# Patient Record
Sex: FEMALE | Race: WHITE | NOT HISPANIC OR LATINO | URBAN - METROPOLITAN AREA
[De-identification: names, ages, dates, MRNs, and addresses within clinical notes are randomized per-mention and may not be internally consistent; named-entity substitution may affect disease eponyms.]

---

## 2024-08-13 ENCOUNTER — APPOINTMENT (OUTPATIENT)
Dept: PHYSICAL THERAPY | Facility: CLINIC | Age: 34
End: 2024-08-13
Payer: COMMERCIAL

## 2024-08-20 ENCOUNTER — APPOINTMENT (OUTPATIENT)
Dept: PHYSICAL THERAPY | Facility: CLINIC | Age: 34
End: 2024-08-20
Payer: COMMERCIAL

## 2024-08-27 ENCOUNTER — TREATMENT (OUTPATIENT)
Dept: PHYSICAL THERAPY | Facility: CLINIC | Age: 34
End: 2024-08-27
Payer: COMMERCIAL

## 2024-08-27 DIAGNOSIS — M54.50 ACUTE BILATERAL LOW BACK PAIN WITHOUT SCIATICA: Primary | ICD-10-CM

## 2024-08-27 DIAGNOSIS — M62.830 BACK SPASM: ICD-10-CM

## 2024-08-27 PROCEDURE — 97110 THERAPEUTIC EXERCISES: CPT | Mod: GP

## 2024-08-27 NOTE — PROGRESS NOTES
Physical Therapy Treatment  Patient Name: Radha Hernández  MRN: [id number]  Today's Date: 8/27/2024   Visit 6/MN  Time Calculation  Start Time: 1630  Stop Time: 1715  Time Calculation (min): 45 min  Diagnosis: acute on chronic LBP    PRECAUTIONS: none    SUBJECTIVE:  Patient reports yesterday had increased R LBP.  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  Compliant with HEP? yes    OBJECTIVE:  R LE lower at ASIS and R medial malleolus.  Equal after MET    TREATMENT:  - Therex:  Mat Ex:  -PPT x 10 reps  -marching with abdom bracing x 10 reps  -bridging with GTB abduction x 10 reps  -resisted hip abd with GTB x 10 reps  -add ball squeeze x 10  -leg lowering x 10   -Les on ball: bridging x 10 reps  -Les on ball: bridge with hamstring curl x 3 reps  Modified planks (knees, elbows) 2 x 30 sec  S/L 1/2 planks x 30 sec bilat  Curent HEP:  Seated hamstring stretch, LTR, pelvic tilts, marching with PT, quad stretch   Added bridge with resisted hip abd with band, leg lowering and resisted hip adduction  -Manual:  -MET to R LE for possible R anterior pelvic rotation x 2 reps    ASSESSMENT:    Patient's pain appears to be over R SIJ.  Will continue to monitor alignment, determine continued need for heel lift L.  PLAN:  Cont to progression flexibility, core strengthening and conditioning as courtney.   Check SIJ and possibly try US to SIJ

## 2024-09-03 ENCOUNTER — TREATMENT (OUTPATIENT)
Dept: PHYSICAL THERAPY | Facility: CLINIC | Age: 34
End: 2024-09-03
Payer: COMMERCIAL

## 2024-09-03 DIAGNOSIS — M54.50 ACUTE BILATERAL LOW BACK PAIN WITHOUT SCIATICA: Primary | ICD-10-CM

## 2024-09-03 DIAGNOSIS — M62.830 BACK SPASM: ICD-10-CM

## 2024-09-03 PROCEDURE — 97110 THERAPEUTIC EXERCISES: CPT | Mod: GP

## 2024-09-03 PROCEDURE — 97035 APP MDLTY 1+ULTRASOUND EA 15: CPT | Mod: GP

## 2024-09-03 NOTE — PROGRESS NOTES
Physical Therapy Treatment  Patient Name: Radha Hernández  MRN: [id number]  Today's Date: 9/3/2024   Visit 7/MN  Time Calculation  Start Time: 1630  Stop Time: 1715  Time Calculation (min): 45 min  Diagnosis: acute on chronic LBP    PRECAUTIONS: none    SUBJECTIVE:  Patient reports some R anterior hip tightness after last visit, later had some back spasms on R side then subsided.  Yesterday, had tightness across LB walking at fair.  Removed lift since yesterday.  Compliant with HEP? yes    OBJECTIVE:  R LE lower at ASIS and R medial malleolus.  Equal after MET    TREATMENT:  - Therex:  Stationary bike x 5 min (seat= 3)  Standing hamstring stretch at stair x 1 min bilat  Standing bilat quad stretch x 1 min  Multihip x 15 reps bilat  -plate 2 hip abduction and plate 3 hip extension  Resisted side stepping at cable column- 7.5# x 5 reps L and R  Curent HEP:  Seated hamstring stretch, LTR, pelvic tilts, marching with PT, quad stretch   Added bridge with resisted hip abd with band, leg lowering and resisted hip adduction  -Manual:  -MET to R LE for possible R anterior pelvic rotation x 2 reps    -Modalities:  US to R SIJ x 8 min at 3 MHz cont, 1.5 w/cm2    ASSESSMENT:    Patient without increased pain during visit and alignment improved after MET  PLAN:  Cont to progression flexibility, core strengthening and conditioning as courtney.   Monitor LE alignment  Go without lift until next visit

## 2024-09-05 DIAGNOSIS — J30.2 SEASONAL ALLERGIES: ICD-10-CM

## 2024-09-05 DIAGNOSIS — Z30.41 ORAL CONTRACEPTIVE USE: ICD-10-CM

## 2024-09-05 RX ORDER — LEVONORGESTREL AND ETHINYL ESTRADIOL 0.15-0.03
1 KIT ORAL DAILY
Qty: 84 TABLET | Refills: 3 | Status: SHIPPED | OUTPATIENT
Start: 2024-09-05

## 2024-09-05 RX ORDER — AZELASTINE 1 MG/ML
1 SPRAY, METERED NASAL 2 TIMES DAILY
Qty: 30 ML | Refills: 3 | Status: SHIPPED | OUTPATIENT
Start: 2024-09-05

## 2024-09-10 ENCOUNTER — TREATMENT (OUTPATIENT)
Dept: PHYSICAL THERAPY | Facility: CLINIC | Age: 34
End: 2024-09-10
Payer: COMMERCIAL

## 2024-09-10 DIAGNOSIS — M62.830 BACK SPASM: ICD-10-CM

## 2024-09-10 DIAGNOSIS — M54.50 ACUTE BILATERAL LOW BACK PAIN WITHOUT SCIATICA: Primary | ICD-10-CM

## 2024-09-10 PROCEDURE — 97110 THERAPEUTIC EXERCISES: CPT | Mod: GP

## 2024-09-10 NOTE — PROGRESS NOTES
Physical Therapy Treatment  Patient Name: Radha Hernández  MRN: [id number]  Today's Date: 9/24/2024   Visit 8/MN     Diagnosis: acute on chronic LBP    PRECAUTIONS: none    SUBJECTIVE:  Patient reports this has been the best week she's had in a long time.  No sharp pain, only pressure.  Compliant with HEP? yes    OBJECTIVE:  LE equal at malleoli today    TREATMENT:  - Therex:  Stationary bike x 5 min (seat= 3)  Standing hamstring stretch at stair x 1 min bilat  Standing bilat quad stretch x 1 min  Multihip x 15 reps bilat  -plate 2 hip abduction and plate 3 hip extension  Resisted side stepping at cable column- 7.5# x 5 reps L and R  Curent HEP:  Seated hamstring stretch, LTR, pelvic tilts, marching with PT, quad stretch   Added bridge with resisted hip abd with band, leg lowering and resisted hip adduction    -Modalities:  US to R SIJ x 8 min at 3 MHz cont, 1.5 w/cm2    ASSESSMENT:    Patient with improved alignment and pain level today.    PLAN:  Cont to progression flexibility, core strengthening and conditioning as courtney.   Monitor LE alignment as needed.

## 2024-09-11 ENCOUNTER — TELEPHONE (OUTPATIENT)
Dept: PHYSICAL THERAPY | Facility: CLINIC | Age: 34
End: 2024-09-11
Payer: COMMERCIAL

## 2024-09-19 ENCOUNTER — TELEPHONE (OUTPATIENT)
Dept: PHYSICAL THERAPY | Facility: CLINIC | Age: 34
End: 2024-09-19
Payer: COMMERCIAL

## 2024-09-26 ENCOUNTER — APPOINTMENT (OUTPATIENT)
Dept: PHYSICAL THERAPY | Facility: CLINIC | Age: 34
End: 2024-09-26
Payer: COMMERCIAL

## 2024-10-03 ENCOUNTER — TREATMENT (OUTPATIENT)
Dept: PHYSICAL THERAPY | Facility: CLINIC | Age: 34
End: 2024-10-03
Payer: COMMERCIAL

## 2024-10-03 DIAGNOSIS — M54.50 ACUTE BILATERAL LOW BACK PAIN WITHOUT SCIATICA: Primary | ICD-10-CM

## 2024-10-03 DIAGNOSIS — M62.830 BACK SPASM: ICD-10-CM

## 2024-10-03 PROCEDURE — 97035 APP MDLTY 1+ULTRASOUND EA 15: CPT | Mod: GP

## 2024-10-03 PROCEDURE — 97110 THERAPEUTIC EXERCISES: CPT | Mod: GP

## 2024-10-03 NOTE — PROGRESS NOTES
Physical Therapy Treatment  Patient Name: Radha Hernández  MRN: [id number]  Today's Date: 10/3/2024   Visit 9/MN  Time Calculation  Start Time: 1630  Stop Time: 1715  Time Calculation (min): 45 min  Diagnosis: acute on chronic LBP    PRECAUTIONS: none    SUBJECTIVE:  Patient reports back pain has been okay lately.  Mostly just stiffness in LB, no spasms.  Wakes with stiffness, also noticed increased pain when sitting at end of the day.  Compliant with HEP? yes    OBJECTIVE:  Gait unremarkable    TREATMENT:  - Therex:  Stationary bike x 5 min (seat= 3)  Standing hamstring stretch at stair x 1 min bilat  Standing bilat quad stretch x 1 min  Multihip x 15 reps bilat  -plate 2 hip abduction and plate 3 hip extension  Les on ball:   -DKC x 2 min  -LTR x 2 min  (Encouraged to try LTR and SKC before getting out of bed in am)    Resisted side stepping at cable column- 7.5# x 5 reps L and R  Curent HEP:  Seated hamstring stretch, LTR, pelvic tilts, marching with PT, quad stretch, bridge with resisted hip abd with band, leg lowering and resisted hip adduction    -Modalities:  US to R SIJ x 8 min at 3 MHz cont, 1.5 w/cm2    ASSESSMENT:    Patient with improved pain overall since beginning PT but does still have some stiffness in LB, possibly due to facet hypertrophy.    PLAN:  Discussed that patient has general appt with Dr. Ulloa in November.  If not improved enough at that time, will inquire about LBP, further imaging.

## 2024-10-10 ENCOUNTER — TREATMENT (OUTPATIENT)
Dept: PHYSICAL THERAPY | Facility: CLINIC | Age: 34
End: 2024-10-10
Payer: COMMERCIAL

## 2024-10-10 DIAGNOSIS — M54.50 ACUTE BILATERAL LOW BACK PAIN WITHOUT SCIATICA: Primary | ICD-10-CM

## 2024-10-10 DIAGNOSIS — M62.830 BACK SPASM: ICD-10-CM

## 2024-10-10 PROCEDURE — 97110 THERAPEUTIC EXERCISES: CPT | Mod: GP

## 2024-10-10 PROCEDURE — 97035 APP MDLTY 1+ULTRASOUND EA 15: CPT | Mod: GP

## 2024-10-10 NOTE — PROGRESS NOTES
Physical Therapy Treatment  Patient Name: Radha Hernández  MRN: [id number]  Today's Date: 10/10/2024   Visit 10/MN  Time Calculation  Start Time: 1630  Stop Time: 1710  Time Calculation (min): 40 min  Diagnosis: acute on chronic LBP    PRECAUTIONS: none    SUBJECTIVE:  Mostly just stiffness in LB, no spasms.  Stiffness more late in the day rather than early morning now.  Compliant with HEP? yes    OBJECTIVE:  More difficulty walking to L with resisted sidestepping than to R    TREATMENT:  - Therex:  Stationary bike x 5 min (seat= 3)  Standing hamstring stretch at stair x 1 min bilat  Standing bilat quad stretch x 1 min  Multihip x 15 reps bilat  -plate 2 hip abduction and flexion, plate 3 hip extension  Resisted side stepping at cable column- 7.5# x 5 reps L and R    Curent HEP:  Seated hamstring stretch, LTR, pelvic tilts, marching with PT, quad stretch, bridge with resisted hip abd with band, leg lowering and resisted hip adduction    -Modalities:  US to R SIJ x 8 min at 3 MHz cont, 1.5 w/cm2    ASSESSMENT:    Patient with improved pain level overall and no longer getting muscle spasms.    PLAN:   May add planks next visit.

## 2024-11-05 NOTE — PROGRESS NOTES
"Subjective   Patient ID: Radha Hernández \"Devan" is a 34 y.o. female who presents for Annual Exam.  Today she is accompanied by alone.     HPI  1.  Healthcare maintenance  Overall patient is doing well.   Immunization: Tdap 11/2023, willing to update Influenza vaccination  COVID-19 vaccine up-to-date x2  Colon Cancer Screening: No family history  OB/GYN: Sees Dr. Garcia, Pap smear July 2024 negative for cytology but positive for HPV, with recommendation to repeat in 1 year  Diet: Working on diet  Exercise: Attempting to exercise more frequently  Tobacco: Denies use  EtOH:  Socially     2.  Anxiety  Takes fluoxetine 40 mg daily and Atarax 25 mg 3 times daily as needed  She had stopped fluoxetine over the summer due to improved mood. 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  Seasonal allergies  Has a history of seasonal allergies and continues to take fluticasone and Astelin  Has been out of Astelin so has not been taking and has noticed increased phlegm in her throat  Requesting refills to be sent to her pharmacy    4.  Vitamin D/B12 deficiency  Has a history of vitamin D and B12 deficiency on her lab work from April 2023  Currently taking supplementation of both    5.  Prediabetes  Hemoglobin A1c was done earlier this year at 5.8%  Patient denies any polyuria/polydipsia  Currently managed with diet and exercise, stated that she decrease carbohydrate and sugar intake  Has lost about 8 pounds with diet and exercise    6.  Chronic lower back pain  Patient has a history of chronic lower back pain with intermittent worsening of symptoms.  May 2024 lumbar x-ray showed multilevel facet hypertrophy which means that these small joints are slightly enlarged which could cause her signs/symptoms of back pain which is most pronounced at L5-S1.  Previously on Medrol Dosepak and " Flexeril during acute on chronic lower back pain, as well as physical therapy, however, is not taking flexeril currently  Reports pain comes and goes when she is doing physical therapy  Her physical therapist noted SIJ inflammation  Endorses occasional spasms and that she cannot stand straight up  Denies any radiation down the legs, numbness or tingling    7. Abdominal nausea  2 month history of loss of appetite and nausea  She typically tries to force herself to eat and she becomes nauseous shortly after  Eats a snack in the mornings and 2 meals throughout the day  Has had an 8 lb weight loss, however, this has been on purpose  Denies localized abdominal pain, bowel changes, bloody stool, vomiting, dysphagia, fever, chills, recent illness  Martin family history of GI cancer    Current Outpatient Medications on File Prior to Visit   Medication Sig Dispense Refill    fluticasone (Flonase) 50 mcg/actuation nasal spray Administer 1 spray into each nostril 2 times a day. 16 g 3    Kurvelo, 28, 0.15-0.03 mg tablet Take 1 tablet by mouth once daily. 84 tablet 3    [DISCONTINUED] azelastine (Astelin) 137 mcg (0.1 %) nasal spray Administer 1 spray into each nostril 2 times a day. 30 mL 3    [DISCONTINUED] cyclobenzaprine (Flexeril) 5 mg tablet Take 1 tablet (5 mg) by mouth 3 times a day. 30 tablet 0    [DISCONTINUED] FLUoxetine (PROzac) 40 mg capsule Take 1 capsule (40 mg) by mouth once daily. 90 capsule 1    [DISCONTINUED] hydrOXYzine HCL (Atarax) 25 mg tablet Take 1 tablet (25 mg) by mouth 3 times a day as needed for anxiety. 30 tablet 0    [DISCONTINUED] omeprazole (PriLOSEC) 20 mg DR capsule Take 1 capsule (20 mg) by mouth in the morning and 1 capsule (20 mg) before bedtime. Do not crush or chew. . 30 capsule 1     No current facility-administered medications on file prior to visit.        No Known Allergies    Immunization History   Administered Date(s) Administered    DTaP, Unspecified 1990, 1990,  "1990, 02/24/1992, 08/02/1995, 11/05/2002    Flu vaccine, trivalent, preservative free, age 6 months and greater (Fluarix/Fluzone/Flulaval) 11/06/2024    Hepatitis B vaccine, 19 yrs and under (RECOMBIVAX, ENGERIX) 08/22/1997, 07/22/1998, 08/16/2001    HiB, unspecified 1990, 04/13/1991, 08/21/1991, 02/24/1992    Influenza, seasonal, injectable 10/15/2020    MMR vaccine, subcutaneous (MMR II) 08/21/1991, 08/16/2001    Meningococcal ACWY-D (Menactra) 4-valent conjugate vaccine 04/20/2006    Meningococcal MPSV4 08/06/2009    Pfizer Purple Cap SARS-CoV-2 02/26/2021, 03/19/2021    Polio, Unspecified 1990, 1990, 02/24/1992, 08/02/1995    Tdap vaccine, age 7 year and older (BOOSTRIX, ADACEL) 07/22/2011, 11/13/2023         Review of Systems  All pertinent positive symptoms are included in the history of present illness.  All other systems have been reviewed and are negative and noncontributory to this patient's current ailments.     Objective   /78 (BP Location: Left arm, Patient Position: Sitting, BP Cuff Size: Adult)   Pulse 87   Ht 1.765 m (5' 9.5\")   Wt 85.4 kg (188 lb 3.2 oz)   SpO2 98%   BMI 27.39 kg/m²   BSA: 2.05 meters squared      Physical Exam  CONSTITUTIONAL - well nourished, well developed, looks like stated age, in no acute distress, not ill-appearing, and not tired appearing  SKIN - normal skin color and pigmentation, normal skin turgor without rash, lesions, or nodules visualized  HEAD - no trauma, normocephalic  EYES - normal external exam  ENT - TM's intact, no injection, no signs of infection, uvula midline, normal tongue movement and throat normal, no exudate, nasal passage without discharge and patent  NECK - supple without rigidity, no neck mass was observed, no thyromegaly or thyroid nodules  CHEST - clear to auscultation, no wheezing, no crackles and no rales, good effort  CARDIAC - regular rate and regular rhythm, no skipped beats, no murmur  ABDOMEN - no " organomegaly, soft, nontender, nondistended, normal bowel sounds, no guarding/rebound/rigidity  EXTREMITIES - no edema, no deformities  NEUROLOGICAL - normal gait, normal balance, normal motor, no ataxia, bilateral patellar DTRs 2+ equal and symmetrical; alert, oriented and no focal signs  PSYCHIATRIC - alert, pleasant and cordial, age-appropriate  IMMUNOLOGIC - no cervical lymphadenopathy      Assessment/Plan   1.  Healthcare maintenance  Complete history and physical examination was performed  EKG normal sinus rhythm  We will notify you of the results of your blood work and make recommendations accordingly  Continue following up with your OB/GYN for your well woman visits  Influenza vaccine updated today    2.  Anxiety  Continue fluoxetine to 40 mg daily and hydroxyzine 25 mg to use on an as-needed basis  Refill sent to your pharmacy  If this worsens, please let me know and we will adjust accordingly    3.  Seasonal allergies  I recommend that you resume taking both nasal sprays twice daily  Continue azelastine and fluticasone as described at today's visit  Can also take mucinex over the counter to help with phlegm in your throat  Refill sent to your pharmacy  If this continues to be an issue, the next step would be otolaryngology consult    4.  B12 and vitamin D deficiency  Continue taking supplementation  We will recheck blood work if you begin to experience symptoms    5.  Prediabetes  Please attempt to eat a well-balanced diet and exercise regularly  We will repeat your hemoglobin A1c    6.  Chronic back pain  We will order a lumbar MRI and notify you of the results  Discussed possible benefit of SIJ injections with PMR which we mutually agreed to hold off on at this time pending MRI results  Please let us know if you change your mind and would like to follow up with PMR for SIJ injections    7.  Abdominal nausea  It was discussed in great detail that most likely this is due to restarting your  Prozac/fluoxetine at a higher dose at 40 mg  We recommend taking the omeprazole twice daily for the next few weeks  If this continues to be an issue, we will discuss a GI consult    Please contact the office if you have any further questions/concerns  Otherwise, please follow-up in 6 months for further care and treatment

## 2024-11-06 ENCOUNTER — LAB (OUTPATIENT)
Dept: LAB | Facility: LAB | Age: 34
End: 2024-11-06
Payer: COMMERCIAL

## 2024-11-06 ENCOUNTER — APPOINTMENT (OUTPATIENT)
Dept: PRIMARY CARE | Facility: CLINIC | Age: 34
End: 2024-11-06
Payer: COMMERCIAL

## 2024-11-06 VITALS
HEIGHT: 70 IN | HEART RATE: 87 BPM | BODY MASS INDEX: 26.94 KG/M2 | SYSTOLIC BLOOD PRESSURE: 114 MMHG | DIASTOLIC BLOOD PRESSURE: 78 MMHG | WEIGHT: 188.2 LBS | OXYGEN SATURATION: 98 %

## 2024-11-06 DIAGNOSIS — F41.9 ANXIETY: ICD-10-CM

## 2024-11-06 DIAGNOSIS — R73.03 PREDIABETES: ICD-10-CM

## 2024-11-06 DIAGNOSIS — Z00.00 HEALTHCARE MAINTENANCE: ICD-10-CM

## 2024-11-06 DIAGNOSIS — K21.9 GASTROESOPHAGEAL REFLUX DISEASE WITHOUT ESOPHAGITIS: ICD-10-CM

## 2024-11-06 DIAGNOSIS — M54.50 ACUTE BILATERAL LOW BACK PAIN WITHOUT SCIATICA: ICD-10-CM

## 2024-11-06 DIAGNOSIS — G89.29 CHRONIC LOW BACK PAIN WITHOUT SCIATICA, UNSPECIFIED BACK PAIN LATERALITY: ICD-10-CM

## 2024-11-06 DIAGNOSIS — Z00.00 HEALTHCARE MAINTENANCE: Primary | ICD-10-CM

## 2024-11-06 DIAGNOSIS — M62.830 BACK SPASM: ICD-10-CM

## 2024-11-06 DIAGNOSIS — J30.2 SEASONAL ALLERGIES: ICD-10-CM

## 2024-11-06 DIAGNOSIS — Z23 FLU VACCINE NEED: ICD-10-CM

## 2024-11-06 DIAGNOSIS — M54.50 CHRONIC LOW BACK PAIN WITHOUT SCIATICA, UNSPECIFIED BACK PAIN LATERALITY: ICD-10-CM

## 2024-11-06 DIAGNOSIS — E55.9 VITAMIN D DEFICIENCY: ICD-10-CM

## 2024-11-06 DIAGNOSIS — E53.8 VITAMIN B12 DEFICIENCY: ICD-10-CM

## 2024-11-06 LAB
ALBUMIN SERPL BCP-MCNC: 4.4 G/DL (ref 3.4–5)
ALP SERPL-CCNC: 31 U/L (ref 33–110)
ALT SERPL W P-5'-P-CCNC: 10 U/L (ref 7–45)
ANION GAP SERPL CALC-SCNC: 10 MMOL/L (ref 10–20)
AST SERPL W P-5'-P-CCNC: 15 U/L (ref 9–39)
BASOPHILS # BLD AUTO: 0.06 X10*3/UL (ref 0–0.1)
BASOPHILS NFR BLD AUTO: 0.9 %
BILIRUB SERPL-MCNC: 0.8 MG/DL (ref 0–1.2)
BUN SERPL-MCNC: 10 MG/DL (ref 6–23)
CALCIUM SERPL-MCNC: 9.1 MG/DL (ref 8.6–10.3)
CHLORIDE SERPL-SCNC: 104 MMOL/L (ref 98–107)
CHOLEST SERPL-MCNC: 152 MG/DL (ref 0–199)
CHOLESTEROL/HDL RATIO: 2.8
CO2 SERPL-SCNC: 27 MMOL/L (ref 21–32)
CREAT SERPL-MCNC: 0.73 MG/DL (ref 0.5–1.05)
EGFRCR SERPLBLD CKD-EPI 2021: >90 ML/MIN/1.73M*2
EOSINOPHIL # BLD AUTO: 0.12 X10*3/UL (ref 0–0.7)
EOSINOPHIL NFR BLD AUTO: 1.8 %
ERYTHROCYTE [DISTWIDTH] IN BLOOD BY AUTOMATED COUNT: 13 % (ref 11.5–14.5)
GLUCOSE SERPL-MCNC: 79 MG/DL (ref 74–99)
HCT VFR BLD AUTO: 45.8 % (ref 36–46)
HCV AB SER QL: NONREACTIVE
HDLC SERPL-MCNC: 54.1 MG/DL
HGB BLD-MCNC: 14.4 G/DL (ref 12–16)
HIV 1+2 AB+HIV1 P24 AG SERPL QL IA: NONREACTIVE
IMM GRANULOCYTES # BLD AUTO: 0.02 X10*3/UL (ref 0–0.7)
IMM GRANULOCYTES NFR BLD AUTO: 0.3 % (ref 0–0.9)
LDLC SERPL CALC-MCNC: 78 MG/DL
LYMPHOCYTES # BLD AUTO: 1.53 X10*3/UL (ref 1.2–4.8)
LYMPHOCYTES NFR BLD AUTO: 23.3 %
MCH RBC QN AUTO: 29.9 PG (ref 26–34)
MCHC RBC AUTO-ENTMCNC: 31.4 G/DL (ref 32–36)
MCV RBC AUTO: 95 FL (ref 80–100)
MONOCYTES # BLD AUTO: 0.56 X10*3/UL (ref 0.1–1)
MONOCYTES NFR BLD AUTO: 8.5 %
NEUTROPHILS # BLD AUTO: 4.27 X10*3/UL (ref 1.2–7.7)
NEUTROPHILS NFR BLD AUTO: 65.2 %
NON HDL CHOLESTEROL: 98 MG/DL (ref 0–149)
NRBC BLD-RTO: 0 /100 WBCS (ref 0–0)
PLATELET # BLD AUTO: 270 X10*3/UL (ref 150–450)
POC APPEARANCE, URINE: CLEAR
POC BILIRUBIN, URINE: NEGATIVE
POC BLOOD, URINE: NEGATIVE
POC COLOR, URINE: NORMAL
POC GLUCOSE, URINE: NEGATIVE MG/DL
POC KETONES, URINE: NEGATIVE MG/DL
POC LEUKOCYTES, URINE: NEGATIVE
POC NITRITE,URINE: NEGATIVE
POC PH, URINE: 6.5 PH
POC PROTEIN, URINE: NEGATIVE MG/DL
POC SPECIFIC GRAVITY, URINE: 1.01
POC UROBILINOGEN, URINE: 0.2 EU/DL
POTASSIUM SERPL-SCNC: 4.3 MMOL/L (ref 3.5–5.3)
PROT SERPL-MCNC: 7 G/DL (ref 6.4–8.2)
RBC # BLD AUTO: 4.82 X10*6/UL (ref 4–5.2)
SODIUM SERPL-SCNC: 137 MMOL/L (ref 136–145)
TRIGL SERPL-MCNC: 100 MG/DL (ref 0–149)
TSH SERPL-ACNC: 1.23 MIU/L (ref 0.44–3.98)
VLDL: 20 MG/DL (ref 0–40)
WBC # BLD AUTO: 6.6 X10*3/UL (ref 4.4–11.3)

## 2024-11-06 PROCEDURE — 93000 ELECTROCARDIOGRAM COMPLETE: CPT | Performed by: STUDENT IN AN ORGANIZED HEALTH CARE EDUCATION/TRAINING PROGRAM

## 2024-11-06 PROCEDURE — 99395 PREV VISIT EST AGE 18-39: CPT | Performed by: STUDENT IN AN ORGANIZED HEALTH CARE EDUCATION/TRAINING PROGRAM

## 2024-11-06 PROCEDURE — 83036 HEMOGLOBIN GLYCOSYLATED A1C: CPT

## 2024-11-06 PROCEDURE — 87389 HIV-1 AG W/HIV-1&-2 AB AG IA: CPT

## 2024-11-06 PROCEDURE — 90656 IIV3 VACC NO PRSV 0.5 ML IM: CPT | Performed by: STUDENT IN AN ORGANIZED HEALTH CARE EDUCATION/TRAINING PROGRAM

## 2024-11-06 PROCEDURE — 3008F BODY MASS INDEX DOCD: CPT | Performed by: STUDENT IN AN ORGANIZED HEALTH CARE EDUCATION/TRAINING PROGRAM

## 2024-11-06 PROCEDURE — 1036F TOBACCO NON-USER: CPT | Performed by: STUDENT IN AN ORGANIZED HEALTH CARE EDUCATION/TRAINING PROGRAM

## 2024-11-06 PROCEDURE — 80061 LIPID PANEL: CPT

## 2024-11-06 PROCEDURE — 36415 COLL VENOUS BLD VENIPUNCTURE: CPT

## 2024-11-06 PROCEDURE — 84443 ASSAY THYROID STIM HORMONE: CPT

## 2024-11-06 PROCEDURE — 85025 COMPLETE CBC W/AUTO DIFF WBC: CPT

## 2024-11-06 PROCEDURE — 80053 COMPREHEN METABOLIC PANEL: CPT

## 2024-11-06 PROCEDURE — 90471 IMMUNIZATION ADMIN: CPT | Performed by: STUDENT IN AN ORGANIZED HEALTH CARE EDUCATION/TRAINING PROGRAM

## 2024-11-06 PROCEDURE — 81002 URINALYSIS NONAUTO W/O SCOPE: CPT | Performed by: STUDENT IN AN ORGANIZED HEALTH CARE EDUCATION/TRAINING PROGRAM

## 2024-11-06 PROCEDURE — 86803 HEPATITIS C AB TEST: CPT

## 2024-11-06 PROCEDURE — 99214 OFFICE O/P EST MOD 30 MIN: CPT | Performed by: STUDENT IN AN ORGANIZED HEALTH CARE EDUCATION/TRAINING PROGRAM

## 2024-11-06 RX ORDER — OMEPRAZOLE 20 MG/1
20 CAPSULE, DELAYED RELEASE ORAL 2 TIMES DAILY
Qty: 180 CAPSULE | Refills: 0 | Status: SHIPPED | OUTPATIENT
Start: 2024-11-06 | End: 2024-11-07 | Stop reason: SDUPTHER

## 2024-11-06 RX ORDER — HYDROXYZINE HYDROCHLORIDE 25 MG/1
25 TABLET, FILM COATED ORAL 3 TIMES DAILY PRN
Qty: 30 TABLET | Refills: 0 | Status: SHIPPED | OUTPATIENT
Start: 2024-11-06

## 2024-11-06 RX ORDER — CYCLOBENZAPRINE HCL 5 MG
5 TABLET ORAL 3 TIMES DAILY
Qty: 30 TABLET | Refills: 0 | Status: CANCELLED | OUTPATIENT
Start: 2024-11-06

## 2024-11-06 RX ORDER — FLUOXETINE HYDROCHLORIDE 40 MG/1
40 CAPSULE ORAL DAILY
Qty: 90 CAPSULE | Refills: 1 | Status: SHIPPED | OUTPATIENT
Start: 2024-11-06

## 2024-11-06 RX ORDER — AZELASTINE 1 MG/ML
1 SPRAY, METERED NASAL 2 TIMES DAILY
Qty: 30 ML | Refills: 3 | Status: SHIPPED | OUTPATIENT
Start: 2024-11-06

## 2024-11-06 RX ORDER — FLUTICASONE PROPIONATE 50 MCG
1 SPRAY, SUSPENSION (ML) NASAL 2 TIMES DAILY
Qty: 16 G | Refills: 3 | Status: CANCELLED | OUTPATIENT
Start: 2024-11-06

## 2024-11-06 ASSESSMENT — PATIENT HEALTH QUESTIONNAIRE - PHQ9
1. LITTLE INTEREST OR PLEASURE IN DOING THINGS: NOT AT ALL
SUM OF ALL RESPONSES TO PHQ9 QUESTIONS 1 AND 2: 0
2. FEELING DOWN, DEPRESSED OR HOPELESS: NOT AT ALL

## 2024-11-07 DIAGNOSIS — K21.9 GASTROESOPHAGEAL REFLUX DISEASE WITHOUT ESOPHAGITIS: ICD-10-CM

## 2024-11-07 LAB
EST. AVERAGE GLUCOSE BLD GHB EST-MCNC: 117 MG/DL
HBA1C MFR BLD: 5.7 %

## 2024-11-07 RX ORDER — OMEPRAZOLE 40 MG/1
40 CAPSULE, DELAYED RELEASE ORAL
Qty: 90 CAPSULE | Refills: 1 | Status: SHIPPED | OUTPATIENT
Start: 2024-11-07

## 2024-11-07 NOTE — RESULT ENCOUNTER NOTE
Sugar, kidneys, liver, electrolytes are all within normal limits    Complete blood cell count shows no anemia    HIV and hepatitis C are negative    Thyroid within normal limits    Cholesterol looks good at 152, HDL 54, LDL 78, triglycerides 100    We are just waiting for the hemoglobin A1c at this time

## 2024-11-07 NOTE — RESULT ENCOUNTER NOTE
Hemoglobin A1c continues to show prediabetes at 5.7%    I recommend diet exercise and we will continue monitoring closely

## 2024-11-21 ENCOUNTER — APPOINTMENT (OUTPATIENT)
Dept: RADIOLOGY | Facility: CLINIC | Age: 34
End: 2024-11-21
Payer: COMMERCIAL

## 2024-11-22 ENCOUNTER — HOSPITAL ENCOUNTER (OUTPATIENT)
Dept: RADIOLOGY | Facility: CLINIC | Age: 34
Discharge: HOME | End: 2024-11-22
Payer: COMMERCIAL

## 2024-11-22 DIAGNOSIS — M62.830 BACK SPASM: ICD-10-CM

## 2024-11-22 DIAGNOSIS — G89.29 CHRONIC LOW BACK PAIN WITHOUT SCIATICA, UNSPECIFIED BACK PAIN LATERALITY: ICD-10-CM

## 2024-11-22 DIAGNOSIS — M54.50 CHRONIC LOW BACK PAIN WITHOUT SCIATICA, UNSPECIFIED BACK PAIN LATERALITY: ICD-10-CM

## 2024-11-22 PROCEDURE — 72148 MRI LUMBAR SPINE W/O DYE: CPT

## 2025-01-19 ENCOUNTER — PATIENT MESSAGE (OUTPATIENT)
Dept: OBSTETRICS AND GYNECOLOGY | Facility: CLINIC | Age: 35
End: 2025-01-19
Payer: COMMERCIAL

## 2025-02-07 NOTE — PATIENT COMMUNICATION
PER DR. BONILLA, PATIENT IS ADVISED IF SHE IS HAVING UNCONTROLLED VOMITING AND NAUSEA SHE IS TO REPORT TO THE ER.     SPOKE TO PATIENT SHE SAID VOMITING HAS BECOME BETTER SINCE 4 AM. SHE STATED SHE CAN KEEP LIQUIDS AND FOOD DOWN.   I ADVISED IF THAT CHANGES AND SHE IS UNABLE TO KEEP ANYTHING DOWN TO THEN REPORT TO THE ER.

## 2025-02-19 ENCOUNTER — APPOINTMENT (OUTPATIENT)
Dept: OBSTETRICS AND GYNECOLOGY | Facility: CLINIC | Age: 35
End: 2025-02-19
Payer: COMMERCIAL

## 2025-02-19 VITALS — SYSTOLIC BLOOD PRESSURE: 118 MMHG | BODY MASS INDEX: 27.36 KG/M2 | WEIGHT: 188 LBS | DIASTOLIC BLOOD PRESSURE: 60 MMHG

## 2025-02-19 DIAGNOSIS — Z32.01 PREGNANCY TEST POSITIVE (HHS-HCC): ICD-10-CM

## 2025-02-19 DIAGNOSIS — Z34.00 INITIAL OBSTETRIC VISIT, ANTEPARTUM (HHS-HCC): Primary | ICD-10-CM

## 2025-02-19 DIAGNOSIS — Z34.00 SUPERVISION OF NORMAL FIRST PREGNANCY, ANTEPARTUM (HHS-HCC): ICD-10-CM

## 2025-02-19 LAB — PREGNANCY TEST URINE, POC: POSITIVE

## 2025-02-19 PROCEDURE — 81025 URINE PREGNANCY TEST: CPT | Performed by: OBSTETRICS & GYNECOLOGY

## 2025-02-19 PROCEDURE — 0500F INITIAL PRENATAL CARE VISIT: CPT | Performed by: OBSTETRICS & GYNECOLOGY

## 2025-02-19 PROCEDURE — 76817 TRANSVAGINAL US OBSTETRIC: CPT | Performed by: OBSTETRICS & GYNECOLOGY

## 2025-02-19 RX ORDER — METOCLOPRAMIDE 5 MG/1
5 TABLET ORAL EVERY 6 HOURS PRN
COMMUNITY
Start: 2025-02-16 | End: 2025-02-23

## 2025-02-19 RX ORDER — PYRIDOXINE HCL (VITAMIN B6) 25 MG
25 TABLET ORAL EVERY 8 HOURS PRN
COMMUNITY
Start: 2025-02-16 | End: 2025-02-26

## 2025-02-19 NOTE — PROGRESS NOTES
Brilinta 90mg    Last Written Prescription Date: 4/18/2016  Last Fill Qty: 180, # refills: 3  Last Office Visit with [facility] or [facility] prescribing provider: 3/1/2017    Creatinine   Date Value Ref Range Status   01/24/2017 0.83 0.66 - 1.25 mg/dL Final     Hemoglobin   Date Value Ref Range Status   12/22/2015 14.1 13.3 - 17.7 g/dL Final     Lab Results   Component Value Date    ALT 33 12/19/2015     Routing refill request to provider for review/approval because:  Labs not current:  Needs yearly Hgb and ALT.  Brunilda Mansfield RN           Subjective   Patient ID [id number]   Sierra Hernández is a 34 y.o.  who presents for an initial prenatal visit.     This pregnancy is planned.    She is feeling nauseous at times. Occasional vomiting. Was at the ED 2/15 for n/v/d. Given fluids. Prescription for B6 and Reglan. Doing better.  No pain.  No bleeding.    OB History    Para Term  AB Living   1 0 0 0 0 0   SAB IAB Ectopic Multiple Live Births   0 0 0 0 0      # Outcome Date GA Lbr Valentino/2nd Weight Sex Type Anes PTL Lv   1 Current                   Objective   Physical Exam  Weight: 85.3 kg (188 lb)  Expected Total Weight Gain: 7 kg (15 lb)-11.5 kg (25 lb)   Pregravid BMI: 27.37  BP: 118/60    Fetal Heart Rate: +US     OBGyn Exam    Constitutional: Alert and in no acute distress.     Pulmonary: No respiratory distress.     Genitourinary:   External genitalia: Normal appearance.  No inguinal lymphadenopathy.   Bartholin's, Urethral, and Skenes Glands: Normal.   Urethra: Normal. Bladder: Normal on palpation.   Vagina: Normal.   Inspection of perianal area: Normal.    Psychiatric: Alert and oriented x 3. Affect normal to patient's baseline. Mood: Appropriate.    TVUS: Single, viable IUP. Fetus measures 8w2d (18 mm) by CRL. Normal cardiac activity.    Assessment/Plan   Diagnoses and all orders for this visit:  Initial obstetric visit, antepartum ([facility])  Pregnancy test positive ([facility])  -     POCT pregnancy, urine manually resulted  -     US OB transvaginal  Supervision of normal first pregnancy, antepartum ([facility])  -     CBC Anemia Panel With Reflex,Pregnancy; Future  -     Hemoglobin A1C; Future  -     Hepatitis B Surface Antigen; Future  -     Hepatitis C Antibody; Future  -     Rubella Antibody, IgG; Future  -     Syphilis Screen with Reflex; Future  -     Type And Screen Is this order related to pregnancy or an upcoming surgery? Yes; Where will this surgery/delivery be performed? [facility]; What is the date of the  surgery? 9/29/2025; Has this patient ever had a transfusion? No; Has this...; Future  -     HIV 1/2 Antigen/Antibody Screen with Reflex to Confirmation; Future  -     US OB transvaginal    Viable pregnancy identified on ultrasound.  Prenatal Labs ordered  Daily prenatal vitamins recommended.  Advised on starting aspirin at 10-12 weeks.   She will continue B6, Reglan for nausea/vomiting. Discussed Unisom at bedtime also.  First trimester screening information given.  Follow up in 2-3 weeks for return OB visit.

## 2025-02-20 LAB — CRL: 18 MM

## 2025-02-26 PROBLEM — R53.83 FATIGUE: Status: RESOLVED | Noted: 2023-04-11 | Resolved: 2025-02-26

## 2025-02-26 PROBLEM — R10.84 CHRONIC GENERALIZED ABDOMINAL PAIN: Status: RESOLVED | Noted: 2023-04-11 | Resolved: 2025-02-26

## 2025-02-26 PROBLEM — J35.8 TONSILLAR DEBRIS: Status: RESOLVED | Noted: 2023-04-11 | Resolved: 2025-02-26

## 2025-02-26 PROBLEM — E55.9 VITAMIN D DEFICIENCY: Status: RESOLVED | Noted: 2023-11-13 | Resolved: 2025-02-26

## 2025-02-26 PROBLEM — R10.11 RIGHT UPPER QUADRANT ABDOMINAL PAIN: Status: RESOLVED | Noted: 2023-04-11 | Resolved: 2025-02-26

## 2025-02-26 PROBLEM — R31.9 HEMATURIA: Status: RESOLVED | Noted: 2023-04-11 | Resolved: 2025-02-26

## 2025-02-26 PROBLEM — Z34.00 SUPERVISION OF NORMAL FIRST PREGNANCY, ANTEPARTUM (HHS-HCC): Status: ACTIVE | Noted: 2025-02-26

## 2025-02-26 PROBLEM — N91.2 AMENORRHEA: Status: RESOLVED | Noted: 2023-04-11 | Resolved: 2025-02-26

## 2025-02-26 PROBLEM — G89.29 CHRONIC GENERALIZED ABDOMINAL PAIN: Status: RESOLVED | Noted: 2023-04-11 | Resolved: 2025-02-26

## 2025-02-26 PROBLEM — G89.29 CHRONIC FOOT PAIN: Status: RESOLVED | Noted: 2023-04-11 | Resolved: 2025-02-26

## 2025-02-26 PROBLEM — R05.9 COUGH: Status: RESOLVED | Noted: 2023-04-11 | Resolved: 2025-02-26

## 2025-02-26 PROBLEM — E53.8 VITAMIN B12 DEFICIENCY: Status: RESOLVED | Noted: 2023-11-13 | Resolved: 2025-02-26

## 2025-02-26 PROBLEM — J02.9 TONSILLOPHARYNGITIS: Status: RESOLVED | Noted: 2023-04-11 | Resolved: 2025-02-26

## 2025-02-26 PROBLEM — M79.673 CHRONIC FOOT PAIN: Status: RESOLVED | Noted: 2023-04-11 | Resolved: 2025-02-26

## 2025-02-26 PROBLEM — K92.1 HEMATOCHEZIA: Status: RESOLVED | Noted: 2023-04-11 | Resolved: 2025-02-26

## 2025-02-26 PROBLEM — R07.89 CHEST PAIN, ATYPICAL: Status: RESOLVED | Noted: 2023-04-11 | Resolved: 2025-02-26

## 2025-02-26 PROBLEM — B07.0 PLANTAR WART OF RIGHT FOOT: Status: RESOLVED | Noted: 2023-04-11 | Resolved: 2025-02-26

## 2025-02-26 PROBLEM — R82.998 LEUKOCYTES IN URINE: Status: RESOLVED | Noted: 2023-04-11 | Resolved: 2025-02-26

## 2025-02-26 PROBLEM — N93.8 DYSFUNCTIONAL UTERINE BLEEDING: Status: RESOLVED | Noted: 2023-04-11 | Resolved: 2025-02-26

## 2025-02-26 PROBLEM — K92.1 BLOOD IN STOOL: Status: RESOLVED | Noted: 2023-04-11 | Resolved: 2025-02-26

## 2025-03-04 ENCOUNTER — PATIENT MESSAGE (OUTPATIENT)
Dept: OBSTETRICS AND GYNECOLOGY | Facility: CLINIC | Age: 35
End: 2025-03-04
Payer: COMMERCIAL

## 2025-03-04 DIAGNOSIS — Z34.00 SUPERVISION OF NORMAL FIRST PREGNANCY, ANTEPARTUM (HHS-HCC): Primary | ICD-10-CM

## 2025-03-05 RX ORDER — PYRIDOXINE HCL (VITAMIN B6) 25 MG
25 TABLET ORAL EVERY 8 HOURS PRN
Qty: 30 TABLET | Refills: 1 | Status: SHIPPED | OUTPATIENT
Start: 2025-03-05

## 2025-03-06 NOTE — PROGRESS NOTES
"Ob Visit  25     SUBJECTIVE      HPI: Radha Hernández \"Sierra\" is a 34 y.o.  at 10w3d here for RPNV.    She has no contractions, no bleeding, or no LOF.   Reports no fetal movement.   Patient reports nausea better, was in ER 2/15/25. Taking Unisom/B6, Reglan as needed, has taken it twice.   Has congestion when lies down at night.  Lightheaded and dizzy the past couple days. Water and gateraid. Not vomiting every day.  Hasn't done blood work yet    PMH: seasonal allergies, back issue-bulging disc and anular tear. L5/S1-doing PT.  PSH: dental surgery  SH: denies  STDs: none  Vaccines: Flu vaccine fall . Original Covid.    Pregnancy complicated by:  Nausea/vomiting in pregnancy  Seasonal allergies  L5/S1 bulging disc      OBJECTIVE  Objective   Physical Exam  Weight: 83.8 kg (184 lb 12.8 oz)  BP: 112/70  Fetal Heart Rate: ultrasound Fundal Height (cm): 10 cm    Constitutional: Alert and in no acute distress. Well developed, well nourished.  Neck: no neck asymmetry. Supple and thyroid not enlarged and there were no palpable thyroid nodules.  Chest: Breasts normal appearance, no nipple discharge and no skin changes and palpation of breasts and axillae: no palpable mass and no axillary lymphadenopathy.  Abdomen: soft nontender; no abdominal mass palpated, no organomegaly and no hernias.  Genitourinary: external genitalia: normal, no inguinal lymphadenopathy, Bartholin's urethral and [place]'s glands: normal, urethra: normal and bladder: normal on palpation.  Vagina: normal.  Cervix: normal.  Uterus: 10 weeks.  Right adnexa/parametria: normal.  Left adnexa/parametria: normal.  Skin: normal skin color and pigmentation, normal skin turgor and no rash.  Psychiatric: alert and oriented x 3, affect normal to patient baseline and mood appropriate.   U/S: viable IUP 10.4 weeks, c/w dates.    Lab Results   Component Value Date    HGB 14.4 2024    HCT 45.8 2024         ASSESSMENT & " "EFE Garcia Na Finkmary janedarshan \"Sierra\" is a 34 y.o.  at 10w3d here for the following concerns we addressed today:    Problem List Items Addressed This Visit    None  Visit Diagnoses       Encounter for supervision of normal first pregnancy in first trimester        Relevant Orders    POCT UA Automated manually resulted (Completed)    Urine Culture    C. trachomatis / N. gonorrhoeae, Amplified, Urogenital    Dizziness        Relevant Orders    Comprehensive Metabolic Panel        -Urine culture  -GC/CT  -not sure about genetics, will call us if interested. Has information.    -Didn't get blood work done last visit, will add CMP and do today. Encouraged fluids, electrolytes.  -Discussed  mg.  -Up to date on Flu, Covid vaccine recommended.  -RTC in 2 weeks to check on weight.      Wilda Puga MD      "

## 2025-03-07 ENCOUNTER — APPOINTMENT (OUTPATIENT)
Dept: OBSTETRICS AND GYNECOLOGY | Facility: CLINIC | Age: 35
End: 2025-03-07
Payer: COMMERCIAL

## 2025-03-07 ENCOUNTER — LAB (OUTPATIENT)
Dept: LAB | Facility: HOSPITAL | Age: 35
End: 2025-03-07
Payer: COMMERCIAL

## 2025-03-07 VITALS — SYSTOLIC BLOOD PRESSURE: 112 MMHG | WEIGHT: 184.8 LBS | DIASTOLIC BLOOD PRESSURE: 70 MMHG | BODY MASS INDEX: 26.9 KG/M2

## 2025-03-07 DIAGNOSIS — Z34.01 ENCOUNTER FOR SUPERVISION OF NORMAL FIRST PREGNANCY IN FIRST TRIMESTER: ICD-10-CM

## 2025-03-07 DIAGNOSIS — R42 DIZZINESS: ICD-10-CM

## 2025-03-07 DIAGNOSIS — Z34.90 ENCOUNTER FOR SUPERVISION OF NORMAL PREGNANCY, UNSPECIFIED, UNSPECIFIED TRIMESTER: Primary | ICD-10-CM

## 2025-03-07 LAB
POC GLUCOSE, URINE: NEGATIVE MG/DL
POC KETONES, URINE: NEGATIVE MG/DL
POC PROTEIN, URINE: ABNORMAL MG/DL
POC SPECIFIC GRAVITY, URINE: 1.01

## 2025-03-07 PROCEDURE — 86850 RBC ANTIBODY SCREEN: CPT

## 2025-03-07 PROCEDURE — 85027 COMPLETE CBC AUTOMATED: CPT

## 2025-03-07 PROCEDURE — 86901 BLOOD TYPING SEROLOGIC RH(D): CPT

## 2025-03-07 PROCEDURE — 86900 BLOOD TYPING SEROLOGIC ABO: CPT

## 2025-03-07 NOTE — PATIENT INSTRUCTIONS
Welcome to prenatal care with [facility]!  You were seen in the office today for your initiation to prenatal care  Continue routine OB precautions at home  Your labs were reviewed today and were noirmal. Routine pelvic cultures, urine culture, and pap smear (if you were due) were done today and you will be notified of the results  If you have had genetic screening labs done, we usually receive the results from [facility] within 7-9 business days and we will call you with the results.   Continue taking prenatal vitamins, it is also recommended to start two 81 mg (baby) Aspirin daily after 10 weeks. This reduces the risk of blood pressure elevations/preeclampsia in the third trimester  Avoid sick contacts and consider getting your Flu (available in office during flu season) and COVID vaccines to protect against infection in pregnancy    What to expect:  -    You will see each of our physicians at least once during your prenatal care. There are 5 physicians (Maral Bernal, Edd, Jose, Tonya, and Jose Maria) and our NP Citlaly Gómez. We rotate OB call to be able to provide the best care to our patients during this important time, and we want to make sure you have had a chance to meet each physician prior to coming in for labor.   -    We will see you in the office every 4 weeks in the first two trimesters, every 2 weeks between 30 and 36 weeks gestation, and weekly following 36 weeks. Anatomy ultrasounds are done at [facility] around 20 weeks, gestational diabetes and anemia screening is done through outpatient labs between 25 and 28 weeks gestation, and labor and delivery preparations (ultrasound to confirm presentation, consent forms, etc) are done at 36 weeks in the office.   -    Visits can seem quick, but provide us with quite a bit of important information. So it is important to try not to miss any visits if possible and make up any cancelled appointments as soon as possible. Make sure you come to each visit with a  full bladder because urine testing for bacteria, glucose, and protein are done at each visit. And although the OB visits may seem quick, we are happy to take the time to answer any questions or discuss any concerns you may have  -    We deliver at [facility]: [address] [place], [address]  -    Birth, lactation, and parenting classes, as well as scheduling for hospital tours can be done through [email or web address].       Make an appointment for routine care in the office in the next 4 weeks  If you are having any bleeding, pain, severe nausea and/or vomiting, or any other concerns prior to your next visit, please call the office to speak to the physician on call. This includes after hours, weekends, and holidays, when the answering service will be able to connect you with the physician on call. [phone] ([place] Office) or [phone] ([place] Office).    Congratulations, we are excited to partner with you in the care of your pregnancy!    Do not drive or operate machinery/Do not make important decisions

## 2025-03-08 LAB
ABO GROUP (TYPE) IN BLOOD: NORMAL
ALBUMIN SERPL-MCNC: 4.4 G/DL (ref 3.6–5.1)
ALP SERPL-CCNC: 40 U/L (ref 31–125)
ALT SERPL-CCNC: 16 U/L (ref 6–29)
ANION GAP SERPL CALCULATED.4IONS-SCNC: 13 MMOL/L (CALC) (ref 7–17)
ANTIBODY SCREEN: NORMAL
AST SERPL-CCNC: 15 U/L (ref 10–30)
BILIRUB SERPL-MCNC: 0.3 MG/DL (ref 0.2–1.2)
BUN SERPL-MCNC: 8 MG/DL (ref 7–25)
C TRACH RRNA SPEC QL NAA+PROBE: NOT DETECTED
CALCIUM SERPL-MCNC: 9.4 MG/DL (ref 8.6–10.2)
CHLORIDE SERPL-SCNC: 105 MMOL/L (ref 98–110)
CO2 SERPL-SCNC: 20 MMOL/L (ref 20–32)
CREAT SERPL-MCNC: 0.52 MG/DL (ref 0.5–0.97)
EGFRCR SERPLBLD CKD-EPI 2021: 125 ML/MIN/1.73M2
ERYTHROCYTE [DISTWIDTH] IN BLOOD BY AUTOMATED COUNT: 12.7 % (ref 11.5–14.5)
EST. AVERAGE GLUCOSE BLD GHB EST-MCNC: 108 MG/DL
EST. AVERAGE GLUCOSE BLD GHB EST-SCNC: 6 MMOL/L
GLUCOSE SERPL-MCNC: 91 MG/DL (ref 65–99)
HBA1C MFR BLD: 5.4 % OF TOTAL HGB
HBV SURFACE AG SERPL QL IA: NORMAL
HCT VFR BLD AUTO: 39.6 % (ref 36–46)
HCV AB SERPL QL IA: NORMAL
HGB BLD-MCNC: 12.9 G/DL (ref 12–16)
HIV 1+2 AB+HIV1 P24 AG SERPL QL IA: NORMAL
MCH RBC QN AUTO: 30.6 PG (ref 26–34)
MCHC RBC AUTO-ENTMCNC: 32.6 G/DL (ref 32–36)
MCV RBC AUTO: 94 FL (ref 80–100)
N GONORRHOEA RRNA SPEC QL NAA+PROBE: NOT DETECTED
NRBC BLD-RTO: 0 /100 WBCS (ref 0–0)
PLATELET # BLD AUTO: 234 X10*3/UL (ref 150–450)
POTASSIUM SERPL-SCNC: 3.9 MMOL/L (ref 3.5–5.3)
PROT SERPL-MCNC: 7 G/DL (ref 6.1–8.1)
QUEST GC CT AMPLIFIED (ALWAYS MESSAGE): NORMAL
RBC # BLD AUTO: 4.21 X10*6/UL (ref 4–5.2)
REFLEX ADDED, ANEMIA PANEL: NORMAL
RH FACTOR (ANTIGEN D): NORMAL
RUBV IGG SERPL IA-ACNC: NORMAL
SODIUM SERPL-SCNC: 138 MMOL/L (ref 135–146)
T PALLIDUM AB SER QL IA: NORMAL
WBC # BLD AUTO: 9.3 X10*3/UL (ref 4.4–11.3)

## 2025-03-09 LAB — BACTERIA UR CULT: NORMAL

## 2025-03-12 ENCOUNTER — TELEPHONE (OUTPATIENT)
Dept: OBSTETRICS AND GYNECOLOGY | Facility: CLINIC | Age: 35
End: 2025-03-12
Payer: COMMERCIAL

## 2025-03-12 DIAGNOSIS — Z13.71 GENETIC DISEASE CARRIER STATUS TESTING, FEMALE: ICD-10-CM

## 2025-03-12 DIAGNOSIS — Z34.01 ENCOUNTER FOR SUPERVISION OF NORMAL FIRST PREGNANCY IN FIRST TRIMESTER: ICD-10-CM

## 2025-03-12 LAB
EST. AVERAGE GLUCOSE BLD GHB EST-MCNC: 108 MG/DL
EST. AVERAGE GLUCOSE BLD GHB EST-SCNC: 6 MMOL/L
HBA1C MFR BLD: 5.4 % OF TOTAL HGB
HBV SURFACE AG SERPL QL IA: NORMAL
HCV AB SERPL QL IA: NORMAL
HIV 1+2 AB+HIV1 P24 AG SERPL QL IA: NORMAL
RUBV IGG SERPL IA-ACNC: 2.31 INDEX
T PALLIDUM AB SER QL IA: NEGATIVE

## 2025-03-19 ENCOUNTER — APPOINTMENT (OUTPATIENT)
Dept: OBSTETRICS AND GYNECOLOGY | Facility: CLINIC | Age: 35
End: 2025-03-19
Payer: COMMERCIAL

## 2025-03-19 VITALS — WEIGHT: 182.2 LBS | SYSTOLIC BLOOD PRESSURE: 110 MMHG | BODY MASS INDEX: 26.52 KG/M2 | DIASTOLIC BLOOD PRESSURE: 60 MMHG

## 2025-03-19 DIAGNOSIS — Z36.9 FIRST TRIMESTER SCREENING (HHS-HCC): ICD-10-CM

## 2025-03-19 DIAGNOSIS — Z34.00 SUPERVISION OF NORMAL FIRST PREGNANCY, ANTEPARTUM (HHS-HCC): Primary | ICD-10-CM

## 2025-03-19 PROBLEM — L70.0 ACNE VULGARIS: Status: RESOLVED | Noted: 2023-04-11 | Resolved: 2025-03-19

## 2025-03-19 PROCEDURE — 0501F PRENATAL FLOW SHEET: CPT | Performed by: OBSTETRICS & GYNECOLOGY

## 2025-03-19 NOTE — PROGRESS NOTES
"Ob Visit  25     SUBJECTIVE    HPI: Radha Hernández \"Sierra\" is a 34 y.o.  at 12w2d here for RPNV.       Doing better.   Not vomiting much anymore.  Able to drink fluids, lots of apple juice. Does well eating fruit.   Eats breakfast and dinner ok, harder time with dinner.  Occasional cramping.  No bleeding.    OBJECTIVE  Visit Vitals  /60   Wt 82.6 kg (182 lb 3.2 oz)   LMP 2024   BMI 26.52 kg/m²   OB Status Pregnant   Smoking Status Never   BSA 2.01 m²        Lab Results   Component Value Date    HGB 12.9 2025    HCT 39.6 2025       Physical Exam:   Weight: 82.6 kg (182 lb 3.2 oz)  Expected Total Weight Gain: 7 kg (15 lb)-11.5 kg (25 lb)   Pregravid BMI: 27.37  BP: 110/60  Fetal Heart Rate: 160                 ASSESSMENT & PLAN    Radha Hernández \"Sierra\" is a 34 y.o.  at 12w2d here for the following concerns we addressed today:    Problem List Items Addressed This Visit       Supervision of normal first pregnancy, antepartum ([facility]) - Primary    Overview         1st Trimester  [x]  OB profile/lab work 3-7-25 NL, BLOOD TYPE IS A+, RUBELLA IMMUNE  [x]  Pap 6-24 NEG HPV POS  [x]  GC DNA probe 3-7-25 NEG/NEG  [x]  Urine culture 3-7-25 NEG  [x]  Early A1c (BMI 30+) 3-7-25 5.4/108  []  Aneuploidy screening (Prequel 10+ wk/First Trimester Check 11-14 wk) PREQUEL  []  Carrier screening FORESIGHT  []  First trimester anatomy US/NT    2nd Trimester  []  Anatomy US (19-21 wks)  []  1 hour Glucose (25-28 wks)  []  CBC (25-28 wks)  []  3 hour GTT (if needed)    3rd Trimester  []  GBS (36-37 wks)  []  L&D consent  []  TOLAC consent (if needed)  []  Medicaid salpingectomy consent    Vaccines  []  COVID  []  Flu (September to May)  []  Tdap (27-36 wks)  []  RSV (32-36 wks Sept-)             Relevant Orders    POCT UA Automated manually resulted (Completed)     Other Visit Diagnoses       First trimester screening ([facility])        Relevant Orders    US " MAC OB imaging order                Less weight loss.  Nausea and vomiting improving.   Reviewed genetic screening options again. They are going to get both the Prequel and Foresight screening.  Order also placed for a first trimester ultrasound.    Precautions reviewed. Advised her to call for any new problems.    RTC in 4 weeks      Esther Garcia MD

## 2025-03-20 ENCOUNTER — TELEPHONE (OUTPATIENT)
Dept: OBSTETRICS AND GYNECOLOGY | Facility: CLINIC | Age: 35
End: 2025-03-20
Payer: COMMERCIAL

## 2025-03-20 NOTE — TELEPHONE ENCOUNTER
Called Sierra. Left a detailed message. Let her know that since she has already had the NIPT and carrier screening blood work done, she does not need the genetics consult appointment. I think that she should just need to have the ultrasound done.   If she still has questions told her she can call back to the office again.

## 2025-03-30 ENCOUNTER — LAB (OUTPATIENT)
Dept: LAB | Facility: HOSPITAL | Age: 35
End: 2025-03-30
Payer: COMMERCIAL

## 2025-04-02 ENCOUNTER — APPOINTMENT (OUTPATIENT)
Dept: GENETICS | Facility: CLINIC | Age: 35
End: 2025-04-02
Payer: COMMERCIAL

## 2025-04-02 ENCOUNTER — HOSPITAL ENCOUNTER (OUTPATIENT)
Dept: RADIOLOGY | Facility: CLINIC | Age: 35
Discharge: HOME | End: 2025-04-02
Payer: COMMERCIAL

## 2025-04-02 DIAGNOSIS — Z36.9 FIRST TRIMESTER SCREENING (HHS-HCC): ICD-10-CM

## 2025-04-02 DIAGNOSIS — Z33.1 PREGNANCY AS INCIDENTAL FINDING (HHS-HCC): ICD-10-CM

## 2025-04-02 PROCEDURE — 76815 OB US LIMITED FETUS(S): CPT | Performed by: OBSTETRICS & GYNECOLOGY

## 2025-04-02 PROCEDURE — 76815 OB US LIMITED FETUS(S): CPT

## 2025-04-07 LAB
COMMENTS - MP RESULT TYPE: NORMAL
SCAN RESULT: NORMAL

## 2025-04-14 DIAGNOSIS — O28.5 ABNORMAL CHROMOSOMAL AND GENETIC FINDING ON ANTENATAL SCREENING MOTHER: Primary | ICD-10-CM

## 2025-04-14 LAB
COMMENTS - MP RESULT TYPE: NORMAL
SCAN RESULT: NORMAL

## 2025-04-15 ENCOUNTER — APPOINTMENT (OUTPATIENT)
Dept: OBSTETRICS AND GYNECOLOGY | Facility: CLINIC | Age: 35
End: 2025-04-15
Payer: COMMERCIAL

## 2025-04-15 VITALS — WEIGHT: 181 LBS | BODY MASS INDEX: 26.35 KG/M2

## 2025-04-15 DIAGNOSIS — Z34.00 SUPERVISION OF NORMAL FIRST PREGNANCY, ANTEPARTUM (HHS-HCC): Primary | ICD-10-CM

## 2025-04-15 DIAGNOSIS — F41.9 ANXIETY: ICD-10-CM

## 2025-04-15 DIAGNOSIS — O28.5 ABNORMAL CHROMOSOMAL AND GENETIC FINDING ON ANTENATAL SCREENING MOTHER: ICD-10-CM

## 2025-04-15 DIAGNOSIS — Z34.02 ENCOUNTER FOR SUPERVISION OF NORMAL FIRST PREGNANCY IN SECOND TRIMESTER: ICD-10-CM

## 2025-04-15 PROCEDURE — 0501F PRENATAL FLOW SHEET: CPT | Performed by: OBSTETRICS & GYNECOLOGY

## 2025-04-15 NOTE — PROGRESS NOTES
"SUBJECTIVE    Radha Hernández \"Sierra\" is a 34 y.o.  at 16w1d here for JASON.  No ctx, VB or LOF.   Endorses nausea and vomiting. Taking Unisom and Reglan without relief.  Concerned about the elevated trisomy 21 results and risk of Down's syndrome in the baby. Will schedule genetic counseling.     Her pregnancy is complicated by:  Anxiety  NIPT trisomy 21      OBJECTIVE    Physical Exam:   Weight: 82.1 kg (181 lb)  Expected Total Weight Gain: 7 kg (15 lb)-11.5 kg (25 lb)   Pregravid BMI: 27.37     Fetal Heart Rate: 145 Fundal Height (cm):  (BU)               ASSESSMENT & PLAN    Problem List Items Addressed This Visit          Gravid and     Supervision of normal first pregnancy, antepartum ([facility]) - Primary    Overview         Prequel testing: Elevated Trisomy 21 risk (91% residual risk)    1st Trimester  [x]  OB profile/lab work 3-7-25 NL, BLOOD TYPE IS A+, RUBELLA IMMUNE  [x]  Pap 24 NEG HPV POS  [x]  GC DNA probe 3-7-25 NEG/NEG  [x]  Urine culture 3-7-25 NEG  [x]  Early A1c (BMI 30+) 3-7-25 5.4/108  [x]  Aneuploidy screening (Prequel 10+ wk/First Trimester Check 11-14 wk) PREQUEL: Elevated Trisomy 21 risk (91% residual risk); T13 and T18 negative  [x]  Carrier screening FORESIGHT - NEG x 3  [x]  First trimester anatomy US/NT  //25 - 14w2d. Too far to measure nuchal translucency, appeared normal. Normal early anatomy findings. Anterior placenta. Follow up for 19-20 week anatomy ultrasound.    2nd Trimester  []  Anatomy US (19-21 wks)  []  1 hour Glucose (25-28 wks)  []  CBC (25-28 wks)  []  3 hour GTT (if needed)    3rd Trimester  []  GBS (36-37 wks)  []  L&D consent  []  TOLAC consent (if needed)  []  Medicaid salpingectomy consent    Vaccines  []  COVID  []  Flu (September to May)  []  Tdap (27-36 wks)  []  RSV (32-36 wks Sept-Antwan)             Abnormal chromosomal and genetic finding on  screening mother    Overview     Prequel testing: Elevated Trisomy 21 risk " (91% residual risk)  - Patient notified 4/14/25. Referral for prenatal genetics, MAC imaging placed.            Mental Health    Anxiety     Other Visit Diagnoses       Encounter for supervision of normal first pregnancy in second trimester        Relevant Orders    POCT UA (nonautomated) manually resulted (Completed)          Discussed appropriate dosing for Unisom and Vit B6 for nausea and vomiting.  Reviewed first trimester US and prequel testing results.   Discussed screening vs diagnostic testing  Genetic counseling previously ordered. Malia Isbell assisting with scheduling  RTC in 4 weeks for routine prenatal visit.      Scribe Attestation  By signing my name below, ISusu, Shante   attest that this documentation has been prepared under the direction and in the presence of Mc Moise MD.

## 2025-04-16 ENCOUNTER — TELEPHONE (OUTPATIENT)
Dept: OBSTETRICS AND GYNECOLOGY | Facility: HOSPITAL | Age: 35
End: 2025-04-16
Payer: COMMERCIAL

## 2025-04-25 ENCOUNTER — CLINICAL SUPPORT (OUTPATIENT)
Dept: GENETICS | Facility: CLINIC | Age: 35
End: 2025-04-25
Payer: COMMERCIAL

## 2025-04-25 ENCOUNTER — APPOINTMENT (OUTPATIENT)
Dept: RADIOLOGY | Facility: CLINIC | Age: 35
End: 2025-04-25
Payer: COMMERCIAL

## 2025-04-25 DIAGNOSIS — O28.5 ABNORMAL CHROMOSOMAL AND GENETIC FINDING ON ANTENATAL SCREENING MOTHER: ICD-10-CM

## 2025-04-25 DIAGNOSIS — O09.521 SUPERVISION OF ELDERLY MULTIGRAVIDA IN FIRST TRIMESTER: ICD-10-CM

## 2025-04-25 DIAGNOSIS — O28.5 ABNORMAL CHROMOSOMAL AND GENETIC FINDING ON ANTENATAL SCREENING OF MOTHER: ICD-10-CM

## 2025-04-25 DIAGNOSIS — Z31.5 ENCOUNTER FOR PROCREATIVE GENETIC COUNSELING: ICD-10-CM

## 2025-04-25 PROCEDURE — 76815 OB US LIMITED FETUS(S): CPT

## 2025-04-25 PROCEDURE — 96041 GENETIC COUNSELING SVC EA 30: CPT

## 2025-04-25 NOTE — PROGRESS NOTES
"Thank you for the referral of Mrs. Radha Hernández. she is a 34 y.o.,  female who was 17 nand 4/7 weeks pregnant at the time of our appointment with an EDC of 2025.  She was seen for genetic counseling due to positive cell free DNA screening for Trisomy 21.    PAST HISTORY:   This is the couple's first pregnancy together.    Ultrasounds in the current pregnancy:  Dating scan on 2025: \"Single, viable IUP. Fetus 8w2d by CRL. \"    NT scan on 2025:  \"Assigned GA14 w + 2 d  Assigned GARFIELD:2025  Impression  =========  The purpose of this early second trimester exam is to establish pregnancy dating and to screen for fetal abnormalities reliably detected at this gestational age. Pregnancy  complicated by AMA, cffDNA is pending. She presented today for NT scan but CRL is too large for this study.  -Gestational age confirmed by fetal biometry  -Reassuring fetal anatomy survey within the limitations of exam (early gestational age and patient acoustic properties)  -The fetal nuchal area does not appear thickened or cystic  -Placental and AFV appear appropriate for gestational age  -Unremarkable adnexa  Due to the gestational age timing of today's exam, this exam should not be utilized for aneuploidy screening. In addition, today's exam does not replace the standard  second trimester anatomic survey performed at 19+ weeks.\"    Early anatomy scan performed today prior to genetic counseling appointment; heart views were sub optimal, however, no soft markers seen on scan. See full report once finalized for additional details.     Prior aneuploidy screening:  Cell free DNA screening was abnormal; positive for Trisomy 21. Performed through [facility] Prequel with 91.06% PPV. Of note, patient does NOT want to learn predicted fetal sex.      Prior carrier screening:  Normal fundamental carrier screening through [facility] (CF/SMA only). No other carrier screening available for review.        Patient " otherwise denies complications such as bleeding or cramping, exposures, infection/illness, and travel outside of the [place] since finding out she was pregnant.    FAMILY HISTORY  Medical and family histories were very briefly reviewed. Patient reports that her partner has a family history of several late onset cancers (including one diagnosis of pancreatic cancer in a great uncle) and late onset heart attacks.    Patient's paternal great grandmother  of breast cancer in her 50s. Maternal grandfather  in his 70s after heart disease s/p triple bypass.    The remainder of the family history was negative for birth defects, intellectual disability, recurrent pregnancy loss, or recognized inherited conditions.  Consanguinity denied.    REPORTED ETHNICITY/RACE:  Patient: White  Patient's partner: White    COUNSELING:    The following information was discussed with your patient:    We discussed the methodology for cell free DNA testing in pregnancy, which analyzes placental cell-free DNA obtained from a mother's blood to screen for the presence of common chromosomal abnormalities. The laboratory that performed Ms. Hernández's testing reports a 99.7% sensitivity for Down syndrome (Trisomy 21), 97.9% for trisomy 18, and 99% for trisomy 13. Specificity for these trisomies is >99%. Although sensitivity and specificity rates are high, not all positive results are confirmed to be true positives. The positive predictive value (PPV), or the chance that this is a true positive result and the fetus is affected with Down syndrome, is approximately 91.06% per the laboratory. The PPV is based on maternal age-related baseline risk (~1 in 300), gestational age, and test metrics alone. This does not take into account any ultrasound findings.   Because cell free DNA is a blood screen, it is not diagnostic and false positives can occur.  Reasons for false positives include (but are not limited to):   limitation of the technology,    confined placental mosaicism,   maternal factors,   or a vanishing twin.  The availability, benefits and limitations of ultrasound study were discussed today. An ultrasound study is recommended at 12 weeks gestation for assessment of nuchal translucency and again at 19-20 weeks gestation to survey fetal organs. An ultrasound study in the second trimester can identify 50% of Down syndrome cases and 90% of trisomy 18 or trisomy 13 cases.   Ultrasound today could not determine the presence or absence of a congenital heart defect in the fetus. Fetal echocardiogram recommended today, which will be scheduled.   The availability of diagnostic fetal chromosome analysis via amniocentesis. The methods, benefits, limitations and risks of amniocentesis and a 1 in 400 risk of complications, including a 1 in 800 risk of miscarriage.  We additionally discussed the option of genetic testing at birth. This can often be done through umbilical cord blood at birth. In this case, it is recommended that the pregnancy is managed as if it is a true positive result.   We briefly reviewed options for delivery; delivering at main [place] allows access to genetics consult at birth as well as close proximity of the NICU. Patient also can deliver at a different hospital. I defer to the [employer]/pediatric cardiology teams for further recommendations.   Recurrence risk was briefly discussed at this visit. Most cases of Down syndrome are not inherited and are due to trisomy 21, but there are rare cases that are due to an inherited translocation.  Cell free DNA cannot differentiate between these types. If the pregnancy is genetically confirmed to have trisomy 21 due to nondisjunction, the risk of another affected pregnancy would be increased above risk based on maternal age. If the Trisomy 21 is due to a translocation, and a parent is a carrier, the risks would be significantly higher. We discussed that diagnostic testing (either prenatally or  postnatally) will give us information on recurrence risk.  There is also the possibility of mosaic Down syndrome, which sometimes can be identified by further prenatal testing but not always.   People with Down syndrome have varying levels of intellectual disability with most individuals falling into the mild-moderate intellectual disability range.  Early intervention is known to optimize outcomes.  A referral to [facility] Grow is recommended for children with Down syndrome.  Most babies with Down syndrome will have hypotonia at birth, and physical therapy is beneficial.  About 50% of children with Down syndrome have a heart defect. If the diagnosis is confirmed, a fetal echocardiogram will be recommended. Children with Down syndrome have an increased risk for vision abnormalities and an ophthalmology exam is recommended by six months of age.  Children with Down syndrome also have an increased risk for hearing problems.  Children with Down syndrome are at an increased risk for hypothyroidism, seizures, GI issues, and childhood leukemia.  There is also an increased risk for Alzheimer’s disease in adulthood. While there are a number of conditions that children with Down syndrome are at risk for, most do not have all of these conditions.  Should the pregnancy be truly affected, options include continuation with closer monitoring, adoption, and termination of pregnancy. Each state has their own laws regarding termination of pregnancy.   The patient was informed that in the state [facility] [address] [address] prohibits anyone from purposely performing or inducing or attempting to perform or induce an  on a pregnant woman if the person has knowledge that the pregnant woman is seeking the , in whole or in part, because of any one of three reasons:  a test result indicating Down syndrome in “an unborn child”;   a prenatal diagnosis of Down syndrome in “an unborn child”;  any other reason to believe that “an unborn child”  has Down syndrome.  Additional family history concerns:  Multifactorial inheritance. We reviewed that conditions such as late onset cancers and heart disease are often considered multifactorial, meaning that they are due to some combination of genetic and environmental risk factors. While we do not have specific testing to assess exact risk, we know that relatives of affected individuals have an elevated risk when compared to the general population of being similarly affected. Radha should inform her doctors, as well as any future pediatricians, of this family history to allow for early intervention and to maximize outcomes.   Young cancer diagnoses in relatives. When an individual is diagnosed with pancreatic cancer at any age, they meet the National Comprehensive Cancer Network (NCCN) guidelines for testing of cancer predisposition genes. First and second degree relatives are also eligible for this testing to determine if there is a genetic predisposition or reason for cancer diagnoses in a family. The patient's partner may be at increased risk of having a mutation if his family members do. [facility] accepts self-referrals to the Cancer Genetics clinic at  by calling [phone].      DISPOSITION:  The patient stated that she understood the above information.  She would like to proceed with the fetal echocardiogram, and nurse coordinator Radha Isbell will contact the patient to schedule.   Patient unsure about if she would like to deliver at [facility] or at [facility].   Amniocentesis declined today. Patient aware that this remains available throughout remainder of pregnancy should she want diagnostic testing.     If there are additional family history concerns, we reviewed that the patient and/or her partner can be re-referred to genetic counseling in the future.    [place] Revised Code ([place]C) 3701.69 requires the [place] Department of Health to develop a Down syndrome information sheet to be  available on the department’s website.  The information sheet is to be given to patients who have a test result indicating Down syndrome, or a prenatal or  diagnosis of Down syndrome.  Physicians, certified nurse-midwives, genetic counselors, hospitals, licensed maternity units,  care nurseries, maternity homes and freestanding birthing centers are required to use the information sheet; I sent her home with a hard copy of this document today.    Thank you for allowing us to participate in the care of your patient.  Should you or your patient have any questions, please do not hesitate to contact our office at [phone].    Total time spent on day of encounter: 80 minutes (60 minutes with patient, 20 minutes on pre/post patient care activities, including documentation).    Sincerely,   La Nena Rodriguez MS, [facility] [place]  Licensed and Certified Genetic Counselor

## 2025-04-28 ENCOUNTER — TELEPHONE (OUTPATIENT)
Dept: OBSTETRICS AND GYNECOLOGY | Facility: HOSPITAL | Age: 35
End: 2025-04-28
Payer: COMMERCIAL

## 2025-05-05 DIAGNOSIS — Z34.00 SUPERVISION OF NORMAL FIRST PREGNANCY, ANTEPARTUM (HHS-HCC): ICD-10-CM

## 2025-05-05 RX ORDER — PYRIDOXINE HCL (VITAMIN B6) 25 MG
25 TABLET ORAL EVERY 8 HOURS PRN
Qty: 30 TABLET | Refills: 1 | Status: SHIPPED | OUTPATIENT
Start: 2025-05-05

## 2025-05-07 ENCOUNTER — HOSPITAL ENCOUNTER (OUTPATIENT)
Dept: RADIOLOGY | Facility: CLINIC | Age: 35
Discharge: HOME | End: 2025-05-07
Payer: COMMERCIAL

## 2025-05-07 DIAGNOSIS — Z36.9 FIRST TRIMESTER SCREENING (HHS-HCC): ICD-10-CM

## 2025-05-07 PROCEDURE — 76815 OB US LIMITED FETUS(S): CPT

## 2025-05-07 PROCEDURE — 76816 OB US FOLLOW-UP PER FETUS: CPT

## 2025-05-09 ENCOUNTER — DOCUMENTATION (OUTPATIENT)
Dept: OBSTETRICS AND GYNECOLOGY | Facility: HOSPITAL | Age: 35
End: 2025-05-09
Payer: COMMERCIAL

## 2025-05-14 ENCOUNTER — DOCUMENTATION (OUTPATIENT)
Dept: OBSTETRICS AND GYNECOLOGY | Facility: HOSPITAL | Age: 35
End: 2025-05-14

## 2025-05-14 ENCOUNTER — ANCILLARY PROCEDURE (OUTPATIENT)
Dept: PEDIATRIC CARDIOLOGY | Facility: CLINIC | Age: 35
End: 2025-05-14
Payer: COMMERCIAL

## 2025-05-14 ENCOUNTER — OFFICE VISIT (OUTPATIENT)
Dept: PEDIATRIC CARDIOLOGY | Facility: CLINIC | Age: 35
End: 2025-05-14
Payer: COMMERCIAL

## 2025-05-14 ENCOUNTER — APPOINTMENT (OUTPATIENT)
Dept: OBSTETRICS AND GYNECOLOGY | Facility: CLINIC | Age: 35
End: 2025-05-14
Payer: COMMERCIAL

## 2025-05-14 VITALS — SYSTOLIC BLOOD PRESSURE: 120 MMHG | DIASTOLIC BLOOD PRESSURE: 62 MMHG | BODY MASS INDEX: 26.56 KG/M2 | WEIGHT: 181 LBS

## 2025-05-14 VITALS
BODY MASS INDEX: 26.78 KG/M2 | TEMPERATURE: 98.6 F | OXYGEN SATURATION: 98 % | HEIGHT: 69 IN | SYSTOLIC BLOOD PRESSURE: 124 MMHG | RESPIRATION RATE: 18 BRPM | HEART RATE: 73 BPM | WEIGHT: 180.78 LBS | DIASTOLIC BLOOD PRESSURE: 79 MMHG

## 2025-05-14 DIAGNOSIS — Z34.00 SUPERVISION OF NORMAL FIRST PREGNANCY, ANTEPARTUM (HHS-HCC): ICD-10-CM

## 2025-05-14 DIAGNOSIS — O28.3 ABNORMAL FETAL ULTRASOUND: ICD-10-CM

## 2025-05-14 DIAGNOSIS — O35.8XX0 MATERNAL CARE FOR OTHER (SUSPECTED) FETAL ABNORMALITY AND DAMAGE, NOT APPLICABLE OR UNSPECIFIED (HHS-HCC): ICD-10-CM

## 2025-05-14 DIAGNOSIS — O35.BXX0 ABNORMAL FETAL ECHOCARDIOGRAPHY AFFECTING ANTEPARTUM CARE OF MOTHER, SINGLE OR UNSPECIFIED FETUS (HHS-HCC): Primary | ICD-10-CM

## 2025-05-14 DIAGNOSIS — Z3A.20 20 WEEKS GESTATION OF PREGNANCY (HHS-HCC): ICD-10-CM

## 2025-05-14 LAB
POC BLOOD, URINE: NEGATIVE
POC GLUCOSE, URINE: NEGATIVE MG/DL
POC KETONES, URINE: NEGATIVE MG/DL
POC LEUKOCYTES, URINE: NEGATIVE
POC NITRITE,URINE: NEGATIVE
POC PROTEIN, URINE: NEGATIVE MG/DL

## 2025-05-14 PROCEDURE — 99205 OFFICE O/P NEW HI 60 MIN: CPT | Performed by: PEDIATRICS

## 2025-05-14 PROCEDURE — 0501F PRENATAL FLOW SHEET: CPT | Performed by: OBSTETRICS & GYNECOLOGY

## 2025-05-14 PROCEDURE — 3008F BODY MASS INDEX DOCD: CPT | Performed by: PEDIATRICS

## 2025-05-14 PROCEDURE — 93325 DOPPLER ECHO COLOR FLOW MAPG: CPT | Performed by: PEDIATRICS

## 2025-05-14 PROCEDURE — 76825 ECHO EXAM OF FETAL HEART: CPT | Performed by: PEDIATRICS

## 2025-05-14 PROCEDURE — 76827 ECHO EXAM OF FETAL HEART: CPT | Performed by: PEDIATRICS

## 2025-05-14 NOTE — PROGRESS NOTES
Subjective   Patient ID [id number]   Sierra Hernández is a 34 y.o.  at 20w2d with a working estimated date of delivery of 2025, by Last Menstrual Period who presents for a routine prenatal visit. She denies vaginal bleeding, leakage of fluid, decreased fetal movements, or contractions.    Her pregnancy is complicated by:  Increased risk T21  Anxiety    Objective   Physical Exam  Weight: 82.1 kg (181 lb)  Expected Total Weight Gain: 7 kg (15 lb)-11.5 kg (25 lb)   Pregravid BMI: 27.59  BP: 120/62         Prenatal Labs  Urine dip:  Lab Results   Component Value Date    KETONESU NEGATIVE 04/15/2025       Lab Results   Component Value Date    HGB 12.9 2025    HCT 39.6 2025    ABO A 2025    HEPBSAG NON-REACTIVE 2025           Imaging: cardiac abnormality on fetal echo, increased risk T21      Assessment/Plan   Problem List Items Addressed This Visit           ICD-10-CM    20 weeks gestation of pregnancy ([facility]) Z3A.20    Relevant Orders    POCT UA Automated manually resulted     Medical Problems       Problem List       Allergic rhinitis    Anxiety    20 weeks gestation of pregnancy ([facility])    Overview Addendum 2025  4:20 PM by Ina Cruz, DO       Probable Trisomy 21.  Prequel testing: Elevated Trisomy 21 risk (91% residual risk) and abnormal findings at 19 week ultrasound    1st Trimester  [x]  OB profile/lab work 3-7-25 NL, BLOOD TYPE IS A+, RUBELLA IMMUNE  [x]  Pap 24 NEG HPV POS  [x]  GC DNA probe 3-7-25 NEG/NEG  [x]  Urine culture 3-7-25 NEG  [x]  Early A1c (BMI 30+) 3-7-25 5.4/108  [x]  Aneuploidy screening (Prequel 10+ wk/First Trimester Check 11-14 wk) PREQUEL: Elevated Trisomy 21 risk (91% residual risk); T13 and T18 negative  [x]  Carrier screening FORESIGHT - NEG x 3  [x]  First trimester anatomy US/NT  25 - 14w2d. Too far to measure nuchal translucency, appeared normal. Normal early anatomy findings. Anterior placenta. Follow up for  19-20 week anatomy ultrasound.    2nd Trimester  [x]  Anatomy US (19-21 wks). Ultrasound 25 at 19w2d: Biometry c/w GA. Anterior placenta. Decreased BPD/HL and BPD/FL ratios, mild ventriculomegaly and a suspected AV canal defect in the setting of positive screening for Trisomy 21 is highly concerning for Trisomy 21. Discussed that diagnostic testing is still available, but suspect will return positive for Trisomy 21. Radha desires to defer additional testing at this time.  Fetal echo is scheduled. We will add her to our Fetal Care list. We will plan on Radha returning for a growth ultrasound at 28 weeks gestation.  []  1 hour Glucose (25-28 wks) ORDERED  []  CBC (25-28 wks) ORDERED  []  3 hour GTT (if needed)    3rd Trimester  []  GBS (36-37 wks)  []  L&D consent  []  TOLAC consent (if needed)  []  Medicaid salpingectomy consent    Vaccines  []  COVID  []  Flu (September to May)  []  Tdap (27-36 wks)  []  RSV (32-36 wks Sept-)             Abnormal chromosomal and genetic finding on  screening mother    Overview Addendum 2025  1:17 PM by Esther Garcia MD   Prequel testing: Elevated Trisomy 21 risk (91% residual risk)  - Patient notified 25.     Genetics consult done 25. Amniocentesis declined.    Ultrasound 25 at 19w2d: Decreased BPD/HL and BPD/FL ratios, mild ventriculomegaly and a suspected AV canal defect in the setting of positive screening for Trisomy 21 is highly concerning for Trisomy 21. Discussed that diagnostic testing is still available, but suspect will return positive for Trisomy 21. Radha desires to defer additional testing at this time.  We will add her to our Fetal Care list. We will plan on Radha returning for a growth ultrasound at 28 weeks gestation.               Continue prenatal vitamin.  Labs reviewed.  Rhogam not indicated  GTT ordered.    Follow up in 4 weeks for a routine prenatal visit.  Ina Cruz,

## 2025-05-14 NOTE — PATIENT INSTRUCTIONS
You were seen in the office today for routine OB care and had normal findings on exam  Continue routine OB precautions at home  Continue taking prenatal vitamins   Avoid sick contacts and consider getting your Flu (available in office during flu season) and COVID vaccines to protect against infection in pregnancy  You will be given an order for your anatomy ultrasound. Please schedule at [facility] OB imaging between 19 and 22 weeks gestation [phone]  You will be given a bottle of Glucola and orders for your second trimester labs. Please complete these between 25 and 28 weeks gestation. You may complete these at any  outpatient lab, and do not need an appointment  Make an appointment for routine care in the office in the next 4 weeks  If you are having any concerns prior to your next visit please call the office to speak to the physician on call. This includes after hours, weekends, and holidays, when the answering service will be able to connect you with the physician on call. [phone] ([place] [facility]) or [phone] [facility].

## 2025-05-14 NOTE — LETTER
"May 14, 2025     Yasmani Briones MD  [address]  [facility] [address]    Patient: Sierra Hernández   YOB: 1990   Date of Visit: 2025       Dear Dr. Yasmani Briones MD:    Thank you for referring Sierra Hernández to me for evaluation. Below are my notes for this consultation.  If you have questions, please do not hesitate to call me. I look forward to following your patient along with you.       Sincerely,     Carlos Chilel MD      CC: Ina Cruz, DO Radha Isbell, APRN-CN, [facility]  Adela Amin, APRN-CNP  ______________________________________________________________________________________    Radha Hernández was seen at the request of Yasmani Briones for a chief complaint of increased risk of trisomy 21 on NIPT and possible AVC defect; a report with my findings is being sent via written or electronic means the referring physician with my recommendations for treatment.     I had the pleasure of seeing Radha Zarate \"Sierra\" Edgar in Pediatric Cardiology consultation at our [place] location as part of our prenatal heart program for increased risk of trisomy 21 on NIPT and possible AVC defect.  She is a 34 y.o. year-old  woman, currently 20w2d weeks gestation. Patient's last menstrual period was 2024. Estimated Date of Delivery: 25.  There have been no pregnancy complications.   She has not been hospitalized during this pregnancy.  She had a NIPT, which showed an increased risk of trisomy 21 (91% PPV).  She had a second trimester ultrasound which was concerning for a possible AVC canal.      Prior to the visit, I personally reviewed the cardiac portions of the obstetrical ultrasound performed on 25.  There is normal segmental anatomy with normal 4 chamber, outflow tract and 3 vessel views.  There is no evidence of septation defect, right or left ventricular outflow obstruction or significant valvular " "regurgitation.    Her previous obstetrical history is without complication.  Her past medical history is significant for anxiety.  She has no history of congenital heart disease, arrhythmia, cardiomyopathy, hypercholesterolemia, hypertension, diabetes, rheumatic heart disease, cancer, asthma, lupus, Sjogren syndrome, clotting disorder, depression, alcohol abuse, phenylketonuria, or DiGeorge.  She has had no surgeries.  She takes Reglan, vitamin B6, Unisom, and TUMS.  She has no known allergies.  She is currently taking prenatal vitamins.      Her family history is negative for congenital heart disease, early atherosclerosis, sudden cardiac death, long QT syndrome, cardiomyopathy, aortic aneurysm, or genetic or metabolic disease.  She has a maternal uncle that passed away at age 40 from an MI and a maternal grandfather that had heart disease.    She currently lives with her spouse and is .  She works as a .  She does not smoke.  She denies illicit drug use or alcohol abuse.  She denies verbal, sexual, or physical abuse.     Delivery [facility]: [facility] or [facility]  Father of the baby's name: same    /79 (BP Location: Right arm, Patient Position: Sitting, BP Cuff Size: Adult)   Pulse 73   Temp 37 °C (98.6 °F)   Resp 18   Ht 1.758 m (5' 9.21\")   Wt 82 kg (180 lb 12.4 oz)   LMP 12/23/2024   SpO2 98%   BMI 26.53 kg/m²     She was resting comfortably in the examination room and alert, active and in no respiratory distress. Skin was without rash.  HEENT: moist mucous membranes, no JVD, goiter. Breathing is not labored.  She was acyanotic.  There was no peripheral edema.   The abdomen was gravid, soft, nontender with normal bowel sounds.  The liver was not palpable.  The spleen tip was not palpable.  She had a normal gait and normal strength in all extremities.  Cranial nerves II - XII are intact.  She had no clubbing, cyanosis, or edema.    Two-dimensional sector scan and Doppler fetal " "echocardiogram performed at 20w2d weeks gestation show segmental anatomy S,D,S with complete atrioventricular canal defect. There is no atrial dilation. The foramen ovale is patent. There is a large primum atrial septal defect. Two of four pulmonary veins (one right and one left) are seen returning normally to the left atrium. Normal superior vena cava and inferior vena cava. There is a common atrioventricular valve; with trivial atrioventricular valve regurgitation. In some views, the common valve appears to favor the left ventricle, however, there right ventricle is apex forming.  There is a a large inlet ventricular septal defect.  The left and right ventricular size and shortening are normal. No left ventricular outflow tract or right ventricular outflow tract obstruction. There is a left aortic arch. The aortic and ductal arches are patent. The fetal heart rate was within normal limits without ectopy or arrhythmia seen.  The spectral Doppler patterns were normal across all valves.  The spectral Doppler patterns of the ductal arch, aortic arch, aortic isthmus and umbilical artery were within normal limits.  No pericardial effusion.    In summary, Radha Na Zarate \"Sierra\" Edgar is a 34 y.o. year-old  woman, currently 20w2d weeks gestation, who had a fetal echocardiogram at today's visit that demonstrated a complete atrioventricular canal defect.  The defect appears balanced at the atrial and ventricular level. The diagnosis was discussed in detail with the parents including medical management in the post-ana maria period and surgical intervention, which generally occurs around 4-6 months of age.   In the meantime, we did not make any changes to her current delivery plan.  We did not prescribe any medications.  We did not recommend intervention.  As always, we recommend a heart healthy lifestyle.  Recommend follow up fetal echocardiogram in 4-6 weeks. We recommend a cardiology consultation within 24 hours " after birth.  Disposition will depend to either the NICU or  nursery will depend on this evaluation.  The findings and limitations of fetal echocardiography were explained.    LOC: 2  Today's fetal echocardiogram demonstrated a complete atrioventricular canal defect.  This is a cardiovascular anomaly or disease that is NOT expected to cause hemodynamic instability at birth, but has potential to cause hemodynamic instability.  Neonatology management in   Non emergent cardiology consultation (within 24hrs).  For fetal echocardiogram findings of an unclear etiology, as long as the baby's clinical presentation allows, recommended a cardiology evaluation in conjunction with the neonatology and CTICU teams in [facility] to confirm fetal findings prior to ultimate disposition ([facility], CTICU or NICU*).       Scribe’s statement: I, Adela Amin CNP, am scribing for, and in the presence of, Dr. Chilel.    Thank you for allowing me to participate in Radha's care.  If you have any further questions, please do not hesitate to contact me.     Carlos Chilel M.D.  [facility], Director  Ambulatory Pediatric Cardiology   Division of Pediatric Cardiology  [facility]  [facility]   of Pediatrics, [facility] [employer] [facility]  [facility] -   [address]  [place] [address]  Office:  [phone]  Fax:       [phone]  e-mail:  [email or web address]    I spent greater than 60 minutes in performance of this consultation, of which greater than 50% was related to coordination of care or counseling.

## 2025-05-14 NOTE — PATIENT INSTRUCTIONS
Your fetus has an atrioventricular canal defect (AV canal).  This is sometimes called an atrioventricular septal defect or endocardial cushion defect.    A complete atrioventricular canal defect is actually a combination of several closely associated heart problems that result in:    Atrial septal defect (ASD), a hole in the wall between the upper chambers of the heart  Ventricular septal defect (VSD), a hole in the wall between the pumping chambers of the heart  Abnormalities of the atrioventricular valves (usually mitral and tricuspid), the “doors” between the receiving chambers and the pumping chambers. There should be 2 valves, but in this case, there is only one valve    Blood can move freely among the four heart chambers, mixing oxygen-rich (red) blood with oxygen-poor (blue) blood.  AV canal occurs in two out of 10,000 births.  The condition is common in children with Down syndrome (about 40%).  Surgery in the first six months of life is often necessary to correct the defects.  After surgery, most children lead healthy lives but will need lifelong follow-up care.  A small proportion (~10%) will need additional surgery later in life    In most children, the cause isn't known. It's a very common type of heart defect in children with a chromosome problem, Trisomy 21 (Down syndrome). Some children can have other heart defects along with AV canal.    Normally, the left side of the heart only pumps blood to the body, and the heart's right side only pumps blood to the lungs. In a child with AV canal defect, blood can travel across the holes from the left heart chambers to the right heart chambers and out into the lung arteries. The extra blood being pumped into the lung arteries makes the heart and lungs work harder and the lungs can become congested.    A child with AV canal defect may breathe faster and harder than normal. Infants may have trouble feeding and growing at a normal rate. Symptoms may not  occur until several weeks after birth. High pressure may occur in the blood vessels in the lungs because more blood than normal is being pumped there. If not corrected by surgery around 6 months of age, this causes permanent damage to the lung blood vessels.    In some infants, the common valve between the upper and lower chambers doesn't close properly. This lets blood leak backward from the heart's lower chambers to the upper ones. This leak, called regurgitation or insufficiency, can make the heart work harder, too.    An AV canal can be fixed. Open-heart surgery is needed to repair the defect. Unlike some other types of septal defects, the AV canal defect can't close on its own. Medicines may be used temporarily to help with symptoms, but they don't cure the defect or prevent permanent damage to the lung arteries.     Sometimes it is not possible to see all the heart structures because of the position or size of the fetus. This does not mean they are not there, but may mean that for technical reasons they cannot be assessed. Sometimes this information may not be important; while in some cases it means that definite answers are not possible. The echocardiographer will discuss this with you if necessary, and repeat studies are frequently performed later in the pregnancy.     Certain congenital heart abnormalities are also hard to detect by fetal echocardiograms, but often these are simple abnormalities. We do not mention this to concern you, but rather that you understand that there are technical limitations to such studies. It remains important that your pediatrician provides a normal careful medical examination of the baby (including the heart) takes place after birth, and if there were any suspicious cardiac findings, that these are evaluated in the usual way irrespective of the findings at fetal study.     Certain communications between the two sides of the circulation are normally present in all developing  babies and normally close after birth. We are not able to tell in advance whether this will occur, however there is only a tiny chance that they will not. Persistence of these structures is generally not a difficult problem to deal with.     We direct our attention only to the heart, where we have special expertise. This is not the same as your general obstetric ultrasound scan. Other ultrasound information about the fetus can be obtained from an obstetric ultrasonographer or your obstetrician.

## 2025-05-14 NOTE — PROGRESS NOTES
"Radha Hernández was seen at the request of Yasmani Briones for a chief complaint of increased risk of trisomy 21 on NIPT and possible AVC defect; a report with my findings is being sent via written or electronic means the referring physician with my recommendations for treatment.     I had the pleasure of seeing Radha Zarate \"Sierra\" Edgar in Pediatric Cardiology consultation at our [place] location as part of our prenatal heart program for increased risk of trisomy 21 on NIPT and possible AVC defect.  She is a 34 y.o. year-old  woman, currently 20w2d weeks gestation. Patient's last menstrual period was 2024. Estimated Date of Delivery: 25.  There have been no pregnancy complications.   She has not been hospitalized during this pregnancy.  She had a NIPT, which showed an increased risk of trisomy 21 (91% PPV).  She had a second trimester ultrasound which was concerning for a possible AVC canal.      Prior to the visit, I personally reviewed the cardiac portions of the obstetrical ultrasound performed on 25.  There is normal segmental anatomy with normal 4 chamber, outflow tract and 3 vessel views.  There is no evidence of septation defect, right or left ventricular outflow obstruction or significant valvular regurgitation.    Her previous obstetrical history is without complication.  Her past medical history is significant for anxiety.  She has no history of congenital heart disease, arrhythmia, cardiomyopathy, hypercholesterolemia, hypertension, diabetes, rheumatic heart disease, cancer, asthma, lupus, Sjogren syndrome, clotting disorder, depression, alcohol abuse, phenylketonuria, or DiGeorge.  She has had no surgeries.  She takes Reglan, vitamin B6, Unisom, and TUMS.  She has no known allergies.  She is currently taking prenatal vitamins.      Her family history is negative for congenital heart disease, early atherosclerosis, sudden cardiac death, long QT " "syndrome, cardiomyopathy, aortic aneurysm, or genetic or metabolic disease.  She has a maternal uncle that passed away at age 40 from an MI and a maternal grandfather that had heart disease.    She currently lives with her spouse and is .  She works as a .  She does not smoke.  She denies illicit drug use or alcohol abuse.  She denies verbal, sexual, or physical abuse.     [facility]: [facility] or [facility]  Father of the baby's name: same    /79 (BP Location: Right arm, Patient Position: Sitting, BP Cuff Size: Adult)   Pulse 73   Temp 37 °C (98.6 °F)   Resp 18   Ht 1.758 m (5' 9.21\")   Wt 82 kg (180 lb 12.4 oz)   LMP 12/23/2024   SpO2 98%   BMI 26.53 kg/m²     She was resting comfortably in the examination room and alert, active and in no respiratory distress. Skin was without rash.  HEENT: moist mucous membranes, no JVD, goiter. Breathing is not labored.  She was acyanotic.  There was no peripheral edema.   The abdomen was gravid, soft, nontender with normal bowel sounds.  The liver was not palpable.  The spleen tip was not palpable.  She had a normal gait and normal strength in all extremities.  Cranial nerves II - XII are intact.  She had no clubbing, cyanosis, or edema.    Two-dimensional sector scan and Doppler fetal echocardiogram performed at 20w2d weeks gestation show segmental anatomy S,D,S with complete atrioventricular canal defect. There is no atrial dilation. The foramen ovale is patent. There is a large primum atrial septal defect. Two of four pulmonary veins (one right and one left) are seen returning normally to the left atrium. Normal superior vena cava and inferior vena cava. There is a common atrioventricular valve; with trivial atrioventricular valve regurgitation. In some views, the common valve appears to favor the left ventricle, however, there right ventricle is apex forming.  There is a a large inlet ventricular septal defect.  The left and right " "ventricular size and shortening are normal. No left ventricular outflow tract or right ventricular outflow tract obstruction. There is a left aortic arch. The aortic and ductal arches are patent. The fetal heart rate was within normal limits without ectopy or arrhythmia seen.  The spectral Doppler patterns were normal across all valves.  The spectral Doppler patterns of the ductal arch, aortic arch, aortic isthmus and umbilical artery were within normal limits.  No pericardial effusion.    In summary, Radha Zarate \"Sierra\" Edgar is a 34 y.o. year-old  woman, currently 20w2d weeks gestation, who had a fetal echocardiogram at today's visit that demonstrated a complete atrioventricular canal defect.  The defect appears balanced at the atrial and ventricular level. The diagnosis was discussed in detail with the parents including medical management in the post- period and surgical intervention, which generally occurs around 4-6 months of age.   In the meantime, we did not make any changes to her current delivery plan.  We did not prescribe any medications.  We did not recommend intervention.  As always, we recommend a heart healthy lifestyle.  Recommend follow up fetal echocardiogram in 4-6 weeks. We recommend a cardiology consultation within 24 hours after birth.  Disposition will depend to either the NICU or  nursery will depend on this evaluation.  The findings and limitations of fetal echocardiography were explained.    LOC: 2  Today's fetal echocardiogram demonstrated a complete atrioventricular canal defect.  This is a cardiovascular anomaly or disease that is NOT expected to cause hemodynamic instability at birth, but has potential to cause hemodynamic instability.  Neonatology management in DR.  Non emergent cardiology consultation (within 24hrs).  For fetal echocardiogram findings of an unclear etiology, as long as the baby's clinical presentation allows, recommended a cardiology " evaluation in conjunction with the neonatology and CTICU teams in [facility] to confirm fetal findings prior to ultimate disposition ([facility], CTICU or NICU*).       Scribe’s statement: I, Adela Amin CNP, am scribing for, and in the presence of, Dr. Chilel.    Thank you for allowing me to participate in Radha's care.  If you have any further questions, please do not hesitate to contact me.     Carlos Chilel M.D.  [facility], Director  Ambulatory Pediatric Cardiology   Division of Pediatric Cardiology  [facility]  [facility]   of Pediatrics, [facility] [employer] [facility]  [facility] -   [address]  [place] [id number]  Office:  [phone]  Fax:       [phone]  e-mail:  [email or web address]    I spent greater than 60 minutes in performance of this consultation, of which greater than 50% was related to coordination of care or counseling.

## 2025-06-17 ENCOUNTER — LAB (OUTPATIENT)
Dept: LAB | Facility: HOSPITAL | Age: 35
End: 2025-06-17
Payer: COMMERCIAL

## 2025-06-17 ENCOUNTER — APPOINTMENT (OUTPATIENT)
Dept: OBSTETRICS AND GYNECOLOGY | Facility: CLINIC | Age: 35
End: 2025-06-17
Payer: COMMERCIAL

## 2025-06-17 VITALS — WEIGHT: 181 LBS | SYSTOLIC BLOOD PRESSURE: 122 MMHG | DIASTOLIC BLOOD PRESSURE: 74 MMHG | BODY MASS INDEX: 26.56 KG/M2

## 2025-06-17 DIAGNOSIS — O28.5 ABNORMAL CHROMOSOMAL AND GENETIC FINDING ON ANTENATAL SCREENING MOTHER: ICD-10-CM

## 2025-06-17 DIAGNOSIS — Z34.00 SUPERVISION OF NORMAL FIRST PREGNANCY, ANTEPARTUM (HHS-HCC): ICD-10-CM

## 2025-06-17 DIAGNOSIS — Z34.00 ENCOUNTER FOR SUPERVISION OF NORMAL FIRST PREGNANCY, UNSPECIFIED TRIMESTER (HHS-HCC): Primary | ICD-10-CM

## 2025-06-17 DIAGNOSIS — Z3A.25 25 WEEKS GESTATION OF PREGNANCY (HHS-HCC): Primary | ICD-10-CM

## 2025-06-17 DIAGNOSIS — O35.BXX0 ABNORMAL FETAL ECHOCARDIOGRAPHY AFFECTING ANTEPARTUM CARE OF MOTHER, SINGLE OR UNSPECIFIED FETUS (HHS-HCC): ICD-10-CM

## 2025-06-17 LAB
ERYTHROCYTE [DISTWIDTH] IN BLOOD BY AUTOMATED COUNT: 13.6 % (ref 11.5–14.5)
HCT VFR BLD AUTO: 38.7 % (ref 36–46)
HGB BLD-MCNC: 12.1 G/DL (ref 12–16)
MCH RBC QN AUTO: 30.8 PG (ref 26–34)
MCHC RBC AUTO-ENTMCNC: 31.3 G/DL (ref 32–36)
MCV RBC AUTO: 99 FL (ref 80–100)
NRBC BLD-RTO: 0 /100 WBCS (ref 0–0)
PLATELET # BLD AUTO: 202 X10*3/UL (ref 150–450)
POC BLOOD, URINE: NEGATIVE
POC GLUCOSE, URINE: NEGATIVE MG/DL
POC KETONES, URINE: NEGATIVE MG/DL
POC LEUKOCYTES, URINE: NEGATIVE
POC NITRITE,URINE: NEGATIVE
POC PROTEIN, URINE: ABNORMAL MG/DL
RBC # BLD AUTO: 3.93 X10*6/UL (ref 4–5.2)
WBC # BLD AUTO: 12.1 X10*3/UL (ref 4.4–11.3)

## 2025-06-17 PROCEDURE — 0501F PRENATAL FLOW SHEET: CPT | Performed by: OBSTETRICS & GYNECOLOGY

## 2025-06-17 PROCEDURE — 36415 COLL VENOUS BLD VENIPUNCTURE: CPT

## 2025-06-17 PROCEDURE — 85027 COMPLETE CBC AUTOMATED: CPT

## 2025-06-17 RX ORDER — PYRIDOXINE HCL (VITAMIN B6) 25 MG
25 TABLET ORAL EVERY 8 HOURS PRN
Qty: 30 TABLET | Refills: 1 | Status: SHIPPED | OUTPATIENT
Start: 2025-06-17

## 2025-06-17 NOTE — PROGRESS NOTES
"Subjective   Radha Na Hernández \"Sierra\" is a 35 y.o.  at 25w1d, presents for a routine prenatal visit.   Doing glucose test today.  Still nausea but much improved over first trimester. Occasionally taking zofran and still taking B6 and unisom.   Has followup with fetal clinic 28 weeks. Has fetal ECHO #2 tomorrow.   We discussed [place] at [facility]. Also discussed possible 39 wk IOL.     Objective     Visit Vitals  /74   Wt 82.1 kg (181 lb)   LMP 2024   BMI 26.56 kg/m²   OB Status Pregnant   Smoking Status Never   BSA 2 m²       Lab Results   Component Value Date    HGB 12.9 2025    HCT 39.6 2025     Problem List Items Addressed This Visit       25 weeks gestation of pregnancy ([facility]) - Primary    Overview   36 yo     Probable Trisomy 21.  Prequel testing: Elevated Trisomy 21 risk (91% residual risk) and abnormal findings at 19 week ultrasound    For delivery at [facility]    1st Trimester  [x]  OB profile/lab work 3-7-25 NL, BLOOD TYPE IS A+, RUBELLA IMMUNE  [x]  Pap 24 NEG HPV POS  [x]  GC DNA probe 3-7-25 NEG/NEG  [x]  Urine culture 3-7-25 NEG  [x]  Early A1c (BMI 30+) 3-7-25 5.4/108  [x]  Aneuploidy screening (Prequel 10+ wk/First Trimester Check 11-14 wk) PREQUEL: Elevated Trisomy 21 risk (91% residual risk); T13 and T18 negative  [x]  Carrier screening FORESIGHT - NEG x 3  [x]  First trimester anatomy US/NT  25 - 14w2d. Too far to measure nuchal translucency, appeared normal. Normal early anatomy findings. Anterior placenta. Follow up for 19-20 week anatomy ultrasound.    2nd Trimester  [x]  Anatomy US (19-21 wks). Ultrasound 25 at 19w2d: Biometry c/w GA. Anterior placenta. Decreased BPD/HL and BPD/FL ratios, mild ventriculomegaly and a suspected AV canal defect in the setting of positive screening for Trisomy 21 is highly concerning for Trisomy 21. Discussed that diagnostic testing is still available, but suspect will return positive for Trisomy 21. Radha" "desires to defer additional testing at this time.  Fetal echo is scheduled. We will add her to our Fetal Care list. We will plan on Radha returning for a growth ultrasound at 28 weeks gestation.  [x]  1 hour Glucose (25-28 wks) 25 55 - PASSED  [x]  CBC (25-28 wks) 25 12.1/38.7      3rd Trimester  []  GBS (36-37 wks)  []  L&D consent  []  TOLAC consent (if needed)  []  Medicaid salpingectomy consent    Vaccines  []  COVID  []  Flu (September to May)  []  Tdap (27-36 wks)  []  RSV (32-36 wks Sept-)             Relevant Medications    pyridoxine (Vitamin B-6) 25 mg tablet    Other Relevant Orders    POCT UA Automated manually resulted (Completed)    Abnormal chromosomal and genetic finding on  screening mother    Overview   Prequel testing: Elevated Trisomy 21 risk (91% residual risk)  - Patient notified 25.     Genetics consult done 25. Amniocentesis declined.    Ultrasound 25 at 19w2d: Decreased BPD/HL and BPD/FL ratios, mild ventriculomegaly and a suspected AV canal defect in the setting of positive screening for Trisomy 21 is highly concerning for Trisomy 21. Discussed that diagnostic testing is still available, but suspect will return positive for Trisomy 21. Radha desires to defer additional testing at this time.  We will add her to our Fetal Care list. We will plan on Radha returning for a growth ultrasound at 28 weeks gestation.         Abnormal fetal echocardiogram affecting antepartum care of mother ([facility])    Overview   2025 fetal ECHO  Summary for Fetus   1. Complete atrioventricular canal defect      -Rastelli type \"A\"      -mildly unbalanced      -inflow commitment greater toward the left.   2. Large ostium primum atrial septal defect.   3. Large inlet ventricular septal defect.   4. Trivial atrioventricular valve regurgitation.              Current Assessment & Plan   2025 ECHO  fetal echocardiogram demonstrated a complete common AV canal " defect. This is a cardiovascular anomaly or disease that is NOT expected to cause hemodynamic instability at birth, but has potential to cause hemodynamic instability. Neonatology management in  Non emergent cardiology consultation. For fetal echocardiogram findings of an unclear etiology, as long as the baby's clinical presentation allows, recommended a cardiology evaluation in conjunction with the neonatology and CTICU teams in [facility] to confirm fetal findings prior to ultimate disposition ([facility], CTICU or NICU*).          Other Visit Diagnoses         Supervision of normal first pregnancy, antepartum ([facility])        Relevant Medications    pyridoxine (Vitamin B-6) 25 mg tablet           Plan    RTO 4 wks.       Ashley Bernal MD

## 2025-06-18 ENCOUNTER — APPOINTMENT (OUTPATIENT)
Dept: PEDIATRIC CARDIOLOGY | Facility: CLINIC | Age: 35
End: 2025-06-18
Payer: COMMERCIAL

## 2025-06-18 VITALS
OXYGEN SATURATION: 98 % | SYSTOLIC BLOOD PRESSURE: 107 MMHG | TEMPERATURE: 98.6 F | HEIGHT: 70 IN | RESPIRATION RATE: 18 BRPM | DIASTOLIC BLOOD PRESSURE: 75 MMHG | BODY MASS INDEX: 26.07 KG/M2 | HEART RATE: 89 BPM | WEIGHT: 182.1 LBS

## 2025-06-18 DIAGNOSIS — O28.3 ABNORMAL FETAL ULTRASOUND: ICD-10-CM

## 2025-06-18 DIAGNOSIS — O35.8XX0 MATERNAL CARE FOR OTHER (SUSPECTED) FETAL ABNORMALITY AND DAMAGE, NOT APPLICABLE OR UNSPECIFIED (HHS-HCC): ICD-10-CM

## 2025-06-18 DIAGNOSIS — O35.BXX0 ABNORMAL FETAL ECHOCARDIOGRAPHY AFFECTING ANTEPARTUM CARE OF MOTHER, SINGLE OR UNSPECIFIED FETUS (HHS-HCC): Primary | ICD-10-CM

## 2025-06-18 LAB
BODY SURFACE AREA: 2.01 M2
GLUCOSE 1H P 50 G GLC PO SERPL-MCNC: 55 MG/DL
REFLEX ADDED, ANEMIA PANEL: NORMAL

## 2025-06-18 PROCEDURE — 76825 ECHO EXAM OF FETAL HEART: CPT | Performed by: PEDIATRICS

## 2025-06-18 PROCEDURE — 93325 DOPPLER ECHO COLOR FLOW MAPG: CPT | Performed by: PEDIATRICS

## 2025-06-18 PROCEDURE — 3008F BODY MASS INDEX DOCD: CPT | Performed by: PEDIATRICS

## 2025-06-18 PROCEDURE — 76827 ECHO EXAM OF FETAL HEART: CPT | Performed by: PEDIATRICS

## 2025-06-18 PROCEDURE — 99215 OFFICE O/P EST HI 40 MIN: CPT | Performed by: PEDIATRICS

## 2025-06-18 NOTE — PROGRESS NOTES
"Radha Zarate Aimedarshan was seen at the request of * No referring provider recorded for this case * for a chief complaint of increased risk of trisomy 21 on NIPT and possible AVC defect; a report with my findings is being sent via written or electronic means the referring physician with my recommendations for treatment.     I had the pleasure of seeing Radha Zarate \"Sierra\" Edgar in Pediatric Cardiology consultation at our [place] location as part of our prenatal heart program for follow up of a fetal echocardiogram on 25 that showed a complete AV canal. She was initially referred for increased risk of trisomy 21 on NIPT and possible AVC defect.  She is a 35 y.o. year-old  woman, currently 25w2d weeks gestation. Patient's last menstrual period was 2024. Estimated Date of Delivery: 25.  There have been no pregnancy complications.   She has not been hospitalized during this pregnancy.  She had a NIPT, which showed an increased risk of trisomy 21 (91% PPV).  She had a second trimester ultrasound which was concerning for a possible AVC canal.      Prior to the visit, I personally reviewed the cardiac portions of the obstetrical ultrasound performed on 25.  There is normal segmental anatomy with normal 4 chamber, outflow tract and 3 vessel views.  There is no evidence of septation defect, right or left ventricular outflow obstruction or significant valvular regurgitation.    Her previous obstetrical history is without complication.  Her past medical history is significant for anxiety.  She has no history of congenital heart disease, arrhythmia, cardiomyopathy, hypercholesterolemia, hypertension, diabetes, rheumatic heart disease, cancer, asthma, lupus, Sjogren syndrome, clotting disorder, depression, alcohol abuse, phenylketonuria, or DiGeorge.  She has had no surgeries.  She takes Reglan, vitamin B6, pepcid, Unisom, and TUMS.  She has no known allergies.  She is " "currently taking prenatal vitamins.      Her family history is negative for congenital heart disease, early atherosclerosis, sudden cardiac death, long QT syndrome, cardiomyopathy, aortic aneurysm, or genetic or metabolic disease.  She has a maternal uncle that passed away at age 40 from an MI and a maternal grandfather that had heart disease.    She currently lives with her spouse and is .  She works as a .  She does not smoke.  She denies illicit drug use or alcohol abuse.  She denies verbal, sexual, or physical abuse.     Delivery Hospital: [facility]  Father of the baby's name: same    /75 (BP Location: Right arm, Patient Position: Sitting, BP Cuff Size: Adult)   Pulse 89   Temp 37 °C (98.6 °F)   Resp 18   Ht 1.767 m (5' 9.57\")   Wt 82.6 kg (182 lb 1.6 oz)   LMP 12/23/2024   SpO2 98%   BMI 26.45 kg/m²     She was resting comfortably in the examination room and alert, active and in no respiratory distress. Skin was without rash.  HEENT: moist mucous membranes, no JVD, goiter. Breathing is not labored.  She was acyanotic.  There was no peripheral edema.   The abdomen was gravid, soft, nontender with normal bowel sounds.  The liver was not palpable.  The spleen tip was not palpable.  She had a normal gait and normal strength in all extremities.  Cranial nerves II - XII are intact.  She had no clubbing, cyanosis, or edema.    Two-dimensional sector scan and Doppler fetal echocardiogram performed at 25w2d weeks gestation show segmental anatomy S,D,S with complete atrioventricular canal defect. There is no atrial dilation. The foramen ovale is patent. There is a large primum atrial septal defect. Two of four pulmonary veins (one right and one left) are seen returning normally to the left atrium. Normal superior vena cava and inferior vena cava. There is a common atrioventricular valve; with trivial atrioventricular valve regurgitation. In some views, the common valve appears " "to favor the left ventricle, however, there right ventricle is apex forming.  There is a a large inlet ventricular septal defect.  The left and right ventricular size and shortening are normal. No left ventricular outflow tract or right ventricular outflow tract obstruction. There is a left aortic arch. The aortic and ductal arches are patent. The fetal heart rate was within normal limits without ectopy or arrhythmia seen.  The spectral Doppler patterns were normal across all valves.  The spectral Doppler patterns of the ductal arch, aortic arch, aortic isthmus and umbilical artery were within normal limits.  No pericardial effusion.    In summary, Radha Zarate \"Sierra\" Edgar is a 35 y.o. year-old  woman, currently 25w2d weeks gestation, who had a fetal echocardiogram at today's visit that demonstrated a complete atrioventricular canal defect.  The defect appears balanced at the atrial and ventricular level. The diagnosis was discussed in detail with the parents including medical management in the post- period and surgical intervention, which generally occurs around 4-6 months of age.   In the meantime, we did not make any changes to her current delivery plan.  We did not prescribe any medications.  We did not recommend intervention.  As always, we recommend a heart healthy lifestyle.  Recommend follow up fetal echocardiogram in 4-6 weeks. We recommend a cardiology consultation within 24 hours after birth.  Disposition will depend to either the NICU or  nursery will depend on this evaluation.  The findings and limitations of fetal echocardiography were explained.    LOC: 2  Today's fetal echocardiogram demonstrated a complete atrioventricular canal defect.  This is a cardiovascular anomaly or disease that is NOT expected to cause hemodynamic instability at birth, but has potential to cause hemodynamic instability.  Neonatology management in [place]  Non emergent cardiology consultation " (within 24hrs).  For fetal echocardiogram findings of an unclear etiology, as long as the baby's clinical presentation allows, recommended a cardiology evaluation in conjunction with the neonatology and CTICU teams in [facility] to confirm fetal findings prior to ultimate disposition ([facility], CTICU or NICU*).       Scribe’s statement: I, Adela Amin CNP, am scribing for, and in the presence of, Dr. Chilel.    Thank you for allowing me to participate in Radha's care.  If you have any further questions, please do not hesitate to contact me.     Carlos Chilel M.D.  [facility], Director  Ambulatory Pediatric Cardiology   Division of Pediatric Cardiology  [facility]  [facility]   of Pediatrics, [employer]  [facility] [address]  [place] [address]  Office:  [phone]  Fax:       [phone]  e-mail:  [email or web address]

## 2025-06-18 NOTE — ASSESSMENT & PLAN NOTE
5- ECHO  fetal echocardiogram demonstrated a complete common AV canal defect. This is a cardiovascular anomaly or disease that is NOT expected to cause hemodynamic instability at birth, but has potential to cause hemodynamic instability. Neonatology management in . Non emergent cardiology consultation. For fetal echocardiogram findings of an unclear etiology, as long as the baby's clinical presentation allows, recommended a cardiology evaluation in conjunction with the neonatology and CTICU teams in [facility] to confirm fetal findings prior to ultimate disposition ([facility], CTICU or NICU*).

## 2025-06-18 NOTE — LETTER
"2025     Yasmani Briones MD  [address]  [facility] [address]    Patient: Sierra Hernández   YOB: 1990   Date of Visit: 2025       Dear Dr. Yasmani Briones MD:    Thank you for referring Sierra Hernández to me for evaluation. Below are my notes for this consultation.  If you have questions, please do not hesitate to call me. I look forward to following your patient along with you.       Sincerely,     Carlos Chilel MD      CC: Ina Cruz, DO Radha Isbell, APRN-CN, [facility]  Adela Amin, APRN-CNP  ______________________________________________________________________________________    Radha Hernández was seen at the request of * No referring provider recorded for this case * for a chief complaint of increased risk of trisomy 21 on NIPT and possible AVC defect; a report with my findings is being sent via written or electronic means the referring physician with my recommendations for treatment.     I had the pleasure of seeing Radha Zarate \"Sierra\" Edgar in Pediatric Cardiology consultation at our [place] location as part of our prenatal heart program for follow up of a fetal echocardiogram on 25 that showed a complete AV canal. She was initially referred for increased risk of trisomy 21 on NIPT and possible AVC defect.  She is a 35 y.o. year-old  woman, currently 25w2d weeks gestation. Patient's last menstrual period was 2024. Estimated Date of Delivery: 25.  There have been no pregnancy complications.   She has not been hospitalized during this pregnancy.  She had a NIPT, which showed an increased risk of trisomy 21 (91% PPV).  She had a second trimester ultrasound which was concerning for a possible AVC canal.      Prior to the visit, I personally reviewed the cardiac portions of the obstetrical ultrasound performed on 25.  There is normal segmental anatomy with normal 4 chamber, outflow tract and " "3 vessel views.  There is no evidence of septation defect, right or left ventricular outflow obstruction or significant valvular regurgitation.    Her previous obstetrical history is without complication.  Her past medical history is significant for anxiety.  She has no history of congenital heart disease, arrhythmia, cardiomyopathy, hypercholesterolemia, hypertension, diabetes, rheumatic heart disease, cancer, asthma, lupus, Sjogren syndrome, clotting disorder, depression, alcohol abuse, phenylketonuria, or DiGeorge.  She has had no surgeries.  She takes Reglan, vitamin B6, pepcid, Unisom, and TUMS.  She has no known allergies.  She is currently taking prenatal vitamins.      Her family history is negative for congenital heart disease, early atherosclerosis, sudden cardiac death, long QT syndrome, cardiomyopathy, aortic aneurysm, or genetic or metabolic disease.  She has a maternal uncle that passed away at age 40 from an MI and a maternal grandfather that had heart disease.    She currently lives with her spouse and is .  She works as a .  She does not smoke.  She denies illicit drug use or alcohol abuse.  She denies verbal, sexual, or physical abuse.     Delivery [facility]: [facility]  Father of the baby's name: same    /75 (BP Location: Right arm, Patient Position: Sitting, BP Cuff Size: Adult)   Pulse 89   Temp 37 °C (98.6 °F)   Resp 18   Ht 1.767 m (5' 9.57\")   Wt 82.6 kg (182 lb 1.6 oz)   LMP 12/23/2024   SpO2 98%   BMI 26.45 kg/m²     She was resting comfortably in the examination room and alert, active and in no respiratory distress. Skin was without rash.  HEENT: moist mucous membranes, no JVD, goiter. Breathing is not labored.  She was acyanotic.  There was no peripheral edema.   The abdomen was gravid, soft, nontender with normal bowel sounds.  The liver was not palpable.  The spleen tip was not palpable.  She had a normal gait and normal strength in all " "extremities.  Cranial nerves II - XII are intact.  She had no clubbing, cyanosis, or edema.    Two-dimensional sector scan and Doppler fetal echocardiogram performed at 25w2d weeks gestation show segmental anatomy S,D,S with complete atrioventricular canal defect. There is no atrial dilation. The foramen ovale is patent. There is a large primum atrial septal defect. Two of four pulmonary veins (one right and one left) are seen returning normally to the left atrium. Normal superior vena cava and inferior vena cava. There is a common atrioventricular valve; with trivial atrioventricular valve regurgitation. In some views, the common valve appears to favor the left ventricle, however, there right ventricle is apex forming.  There is a a large inlet ventricular septal defect.  The left and right ventricular size and shortening are normal. No left ventricular outflow tract or right ventricular outflow tract obstruction. There is a left aortic arch. The aortic and ductal arches are patent. The fetal heart rate was within normal limits without ectopy or arrhythmia seen.  The spectral Doppler patterns were normal across all valves.  The spectral Doppler patterns of the ductal arch, aortic arch, aortic isthmus and umbilical artery were within normal limits.  No pericardial effusion.    In summary, Radha Na Zarate \"Sierra\" Edgar is a 35 y.o. year-old  woman, currently 25w2d weeks gestation, who had a fetal echocardiogram at today's visit that demonstrated a complete atrioventricular canal defect.  The defect appears balanced at the atrial and ventricular level. The diagnosis was discussed in detail with the parents including medical management in the post- period and surgical intervention, which generally occurs around 4-6 months of age.   In the meantime, we did not make any changes to her current delivery plan.  We did not prescribe any medications.  We did not recommend intervention.  As always, we " recommend a heart healthy lifestyle.  Recommend follow up fetal echocardiogram in 4-6 weeks. We recommend a cardiology consultation within 24 hours after birth.  Disposition will depend to either the NICU or  nursery will depend on this evaluation.  The findings and limitations of fetal echocardiography were explained.    LOC: 2  Today's fetal echocardiogram demonstrated a complete atrioventricular canal defect.  This is a cardiovascular anomaly or disease that is NOT expected to cause hemodynamic instability at birth, but has potential to cause hemodynamic instability.  Neonatology management in [place]  Non emergent cardiology consultation (within 24hrs).  For fetal echocardiogram findings of an unclear etiology, as long as the baby's clinical presentation allows, recommended a cardiology evaluation in conjunction with the neonatology and CTICU teams in [facility] to confirm fetal findings prior to ultimate disposition ([facility], CTICU or NICU*).       Scribe’s statement: I, Adela Amin CNP, am scribing for, and in the presence of, Dr. Chilel.    Thank you for allowing me to participate in Radha's care.  If you have any further questions, please do not hesitate to contact me.     Carlos Chilel M.D.  [facility], Director  Ambulatory Pediatric Cardiology   Division of Pediatric Cardiology  [facility]  [facility]   of Pediatrics, [facility] [employer]  [facility] -   [address]  [place] [address]  Office:  [phone]  Fax:       [phone]  e-mail:  [email or web address]

## 2025-06-27 ENCOUNTER — TELEPHONE (OUTPATIENT)
Dept: OBSTETRICS AND GYNECOLOGY | Facility: HOSPITAL | Age: 35
End: 2025-06-27
Payer: COMMERCIAL

## 2025-06-27 NOTE — TELEPHONE ENCOUNTER
Called and spoke with Sierra.   Started yesterday; vomited once, watery stools. No further vomiting, stools less watery, no blood in stool. No fever or chills, no respiratory problems. No sick contacts. Little cramping when has to have BM, no other pain, no bleeding. Baby is moving.  Advise rest, keeping as hydrated as possible, bland food as tolerated until feeling better. In the meantime if symptoms worsen, call again.

## 2025-07-03 ENCOUNTER — TELEPHONE (OUTPATIENT)
Dept: OBSTETRICS AND GYNECOLOGY | Facility: CLINIC | Age: 35
End: 2025-07-03
Payer: COMMERCIAL

## 2025-07-09 ENCOUNTER — HOSPITAL ENCOUNTER (OUTPATIENT)
Dept: RADIOLOGY | Facility: CLINIC | Age: 35
Discharge: HOME | End: 2025-07-09
Payer: COMMERCIAL

## 2025-07-09 DIAGNOSIS — O28.5 ABNORMAL CHROMOSOMAL AND GENETIC FINDING ON ANTENATAL SCREENING MOTHER: ICD-10-CM

## 2025-07-09 PROCEDURE — 76816 OB US FOLLOW-UP PER FETUS: CPT

## 2025-07-09 PROCEDURE — 76819 FETAL BIOPHYS PROFIL W/O NST: CPT | Performed by: STUDENT IN AN ORGANIZED HEALTH CARE EDUCATION/TRAINING PROGRAM

## 2025-07-09 PROCEDURE — 76816 OB US FOLLOW-UP PER FETUS: CPT | Performed by: STUDENT IN AN ORGANIZED HEALTH CARE EDUCATION/TRAINING PROGRAM

## 2025-07-09 PROCEDURE — 76819 FETAL BIOPHYS PROFIL W/O NST: CPT

## 2025-07-11 ENCOUNTER — DOCUMENTATION (OUTPATIENT)
Dept: OBSTETRICS AND GYNECOLOGY | Facility: HOSPITAL | Age: 35
End: 2025-07-11
Payer: COMMERCIAL

## 2025-07-16 ENCOUNTER — APPOINTMENT (OUTPATIENT)
Dept: PRIMARY CARE | Facility: CLINIC | Age: 35
End: 2025-07-16
Payer: COMMERCIAL

## 2025-07-16 VITALS
WEIGHT: 179.2 LBS | SYSTOLIC BLOOD PRESSURE: 106 MMHG | DIASTOLIC BLOOD PRESSURE: 70 MMHG | BODY MASS INDEX: 26.03 KG/M2 | HEART RATE: 91 BPM | OXYGEN SATURATION: 99 %

## 2025-07-16 DIAGNOSIS — R73.03 PREDIABETES: Primary | ICD-10-CM

## 2025-07-16 DIAGNOSIS — F41.9 ANXIETY: ICD-10-CM

## 2025-07-16 DIAGNOSIS — J30.2 SEASONAL ALLERGIES: ICD-10-CM

## 2025-07-16 PROBLEM — Z3A.29 29 WEEKS GESTATION OF PREGNANCY (HHS-HCC): Status: ACTIVE | Noted: 2025-02-26

## 2025-07-16 PROCEDURE — 99214 OFFICE O/P EST MOD 30 MIN: CPT | Performed by: STUDENT IN AN ORGANIZED HEALTH CARE EDUCATION/TRAINING PROGRAM

## 2025-07-16 PROCEDURE — 1036F TOBACCO NON-USER: CPT | Performed by: STUDENT IN AN ORGANIZED HEALTH CARE EDUCATION/TRAINING PROGRAM

## 2025-07-16 RX ORDER — AZELASTINE 1 MG/ML
1 SPRAY, METERED NASAL 2 TIMES DAILY
Qty: 30 ML | Refills: 3 | Status: SHIPPED | OUTPATIENT
Start: 2025-07-16

## 2025-07-16 RX ORDER — FLUTICASONE PROPIONATE 50 MCG
1 SPRAY, SUSPENSION (ML) NASAL 2 TIMES DAILY
Qty: 16 G | Refills: 3 | Status: SHIPPED | OUTPATIENT
Start: 2025-07-16

## 2025-07-16 ASSESSMENT — PATIENT HEALTH QUESTIONNAIRE - PHQ9
2. FEELING DOWN, DEPRESSED OR HOPELESS: NOT AT ALL
SUM OF ALL RESPONSES TO PHQ9 QUESTIONS 1 AND 2: 0
1. LITTLE INTEREST OR PLEASURE IN DOING THINGS: NOT AT ALL

## 2025-07-16 NOTE — PROGRESS NOTES
"Subjective   Patient ID: Radha Hernández \"Sierra\" is a 35 y.o. female who presents for Anxiety and Seasonal Allergies.  Today she is accompanied by alone.     HPI  1.  Healthcare maintenance updates  Formal physical examination was performed at the end of last year  Currently pregnant and due this September and continues to follow-up with OB/GYN    2.  Anxiety  In the past was taking Prozac/fluoxetine 40 mg daily alongside Atarax on as-needed basis  She did stop this over the summer due to improved mood but did notice herself restarting this when school started  But ultimately when she got pregnant she discontinued this completely  Unfortunately she does struggle knowing that there is some health issues involving her unborn child but continues not to take any medication    3.  Seasonal allergies  Has a history of seasonal allergies and continues to take fluticasone and Astelin  Feels well and requesting refills    4.  Vitamin D/B12 deficiency  Has a history of vitamin D and B12 deficiency on her lab work from April 2023  Currently taking supplementation of both    5.  Prediabetes  Hemoglobin A1c was slightly elevated in the past but currently at 5.4%  Feels well without any issues/complaints      Current Outpatient Medications on File Prior to Visit   Medication Sig Dispense Refill    azelastine (Astelin) 137 mcg (0.1 %) nasal spray Administer 1 spray into each nostril 2 times a day. 30 mL 3    fluticasone (Flonase) 50 mcg/actuation nasal spray Administer 1 spray into each nostril 2 times a day. 16 g 3    metoclopramide (Reglan) 5 mg tablet Take 1 tablet (5 mg) by mouth every 6 hours if needed.      prenatal no115/iron/folic acid (PRENATAL 19 ORAL) Take by mouth.      pyridoxine (Vitamin B-6) 25 mg tablet Take 1 tablet (25 mg) by mouth every 8 hours if needed (nausea/vomiting). 30 tablet 1     No current facility-administered medications on file prior to visit.        No Known Allergies    Immunization " History   Administered Date(s) Administered    COVID-19, mRNA, LNP-S, PF, 30 mcg/0.3 mL dose 02/26/2021, 03/19/2021    DTaP, Unspecified 1990, 1990, 1990, 02/24/1992, 08/02/1995, 11/05/2002    Flu vaccine, trivalent, preservative free, age 6 months and greater (Fluarix/Fluzone/Flulaval) 11/06/2024    Hepatitis B vaccine, 19 yrs and under (RECOMBIVAX, ENGERIX) 08/22/1997, 07/22/1998, 08/16/2001    HiB, unspecified 1990, 04/13/1991, 08/21/1991, 02/24/1992    Influenza, seasonal, injectable 10/15/2020    MMR vaccine, subcutaneous (MMR II) 08/21/1991, 08/16/2001    Meningococcal ACWY-D (Menactra) 4-valent conjugate vaccine 04/20/2006    Meningococcal MPSV4 08/06/2009    Polio, Unspecified 1990, 1990, 02/24/1992, 08/02/1995    Tdap vaccine, age 7 year and older (BOOSTRIX, ADACEL) 07/22/2011, 11/13/2023         Review of Systems  All pertinent positive symptoms are included in the history of present illness.  All other systems have been reviewed and are negative and noncontributory to this patient's current ailments.     Objective   /70 (BP Location: Left arm, Patient Position: Sitting, BP Cuff Size: Adult)   Pulse 91   Wt 81.3 kg (179 lb 3.2 oz)   LMP 12/23/2024   SpO2 99%   BMI 26.03 kg/m²   BSA: 2 meters squared      Physical Exam  CONSTITUTIONAL - well nourished, well developed, looks like stated age, in no acute distress, not ill-appearing, and not tired appearing  SKIN - normal skin color and pigmentation, normal skin turgor without rash, lesions, or nodules visualized  HEAD - no trauma, normocephalic  EYES - normal external exam  CHEST -no distressed breathing, good effort  EXTREMITIES - no edema, no deformities  NEUROLOGICAL - normal balance, normal motor, no ataxia  PSYCHIATRIC - alert, pleasant and cordial, age-appropriate      Assessment/Plan   1.  Healthcare maintenance updates  Formal physical examination should be done at the end of the fall after your  pregnancy  Keep on following up with OB/GYN per their protocol    2.  Anxiety  Continue keeping me in the loop on how you are feeling as well as OB/GYN    3.  Seasonal allergies  I would recommend you continue using nasal sprays on an as-needed basis    4.  B12 and vitamin D deficiency  Continue taking supplementation  Blood work will be rechecked in the future    5.  Prediabetes  Keep on eating a well-balanced diet and exercising regularly  Hemoglobin A1c within normal limits earlier this year      Time Spent  Prep time on day of patient encounter: 5 minutes  Time spent directly with patient, family or caregiver: 25 minutes  Additional Time Spent on Patient Care Activities: 0 minutes  Documentation Time: 5 minutes  Other Time Spent: 0 minutes  Total: 35 minutes

## 2025-07-18 ENCOUNTER — APPOINTMENT (OUTPATIENT)
Dept: OBSTETRICS AND GYNECOLOGY | Facility: CLINIC | Age: 35
End: 2025-07-18
Payer: COMMERCIAL

## 2025-07-18 ENCOUNTER — HOSPITAL ENCOUNTER (OUTPATIENT)
Dept: RADIOLOGY | Facility: CLINIC | Age: 35
Discharge: HOME | End: 2025-07-18
Payer: COMMERCIAL

## 2025-07-18 VITALS — SYSTOLIC BLOOD PRESSURE: 116 MMHG | BODY MASS INDEX: 26.3 KG/M2 | DIASTOLIC BLOOD PRESSURE: 76 MMHG | WEIGHT: 181 LBS

## 2025-07-18 DIAGNOSIS — Z3A.29 29 WEEKS GESTATION OF PREGNANCY (HHS-HCC): Primary | ICD-10-CM

## 2025-07-18 DIAGNOSIS — O28.5 ABNORMAL CHROMOSOMAL AND GENETIC FINDING ON ANTENATAL SCREENING MOTHER: ICD-10-CM

## 2025-07-18 PROCEDURE — 76815 OB US LIMITED FETUS(S): CPT

## 2025-07-18 PROCEDURE — 76819 FETAL BIOPHYS PROFIL W/O NST: CPT

## 2025-07-18 PROCEDURE — 0501F PRENATAL FLOW SHEET: CPT | Performed by: OBSTETRICS & GYNECOLOGY

## 2025-07-18 RX ORDER — FAMOTIDINE 40 MG/5ML
POWDER, FOR SUSPENSION ORAL
COMMUNITY

## 2025-07-18 RX ORDER — ASPIRIN 81 MG/1
81 TABLET ORAL DAILY
COMMUNITY

## 2025-07-18 NOTE — PROGRESS NOTES
"Ob Visit  25     SUBJECTIVE    HPI: Radha Hernández \"Sierra\" is a 35 y.o.  at 29w4d here for RPNV.       Feeling ok.  Baby is moving.  Some round ligament pains off and on.  Last night had episode of incontinence around 3 AM, no leakage since. No other urinary symptoms.    OBJECTIVE  Visit Vitals  /76   Wt 82.1 kg (181 lb)   LMP 2024   BMI 26.30 kg/m²   OB Status Pregnant   Smoking Status Never   BSA 2.01 m²        Lab Results   Component Value Date    HGB 12.1 2025    HCT 38.7 2025       Physical Exam:   Weight: 82.1 kg (181 lb)  Expected Total Weight Gain: 7 kg (15 lb)-11.5 kg (25 lb)   Pregravid BMI: 27.31  BP: 116/76  Fetal Heart Rate: 140 Fundal Height (cm): 28 cm               ASSESSMENT & PLAN    Radha Hernández \"Sierra\" is a 35 y.o.  at 29w4d here for the following concerns we addressed today:    Problem List Items Addressed This Visit       29 weeks gestation of pregnancy ([facility]) - Primary    Overview   34 yo     Probable Trisomy 21.  Prequel testing: Elevated Trisomy 21 risk (91% residual risk) and abnormal findings at 19 week ultrasound    For delivery at [facility]    1st Trimester  [x]  OB profile/lab work 3-7-25 NL, BLOOD TYPE IS A+, RUBELLA IMMUNE  [x]  Pap 24 NEG HPV POS  [x]  GC DNA probe 3-7-25 NEG/NEG  [x]  Urine culture 3-7-25 NEG  [x]  Early A1c (BMI 30+) 3-7-25 5.4/108  [x]  Aneuploidy screening (Prequel 10+ wk/First Trimester Check 11-14 wk) PREQUEL: Elevated Trisomy 21 risk (91% residual risk); T13 and T18 negative  [x]  Carrier screening FORESIGHT - NEG x 3  [x]  First trimester anatomy US/NT  25 - 14w2d. Too far to measure nuchal translucency, appeared normal. Normal early anatomy findings. Anterior placenta. Follow up for 19-20 week anatomy ultrasound.    2nd Trimester  [x]  Anatomy US ( wks). Ultrasound 25 at 19w2d: Biometry c/w GA. Anterior placenta. Decreased BPD/HL and BPD/FL ratios, mild " ventriculomegaly and a suspected AV canal defect in the setting of positive screening for Trisomy 21 is highly concerning for Trisomy 21. Discussed that diagnostic testing is still available, but suspect will return positive for Trisomy 21. Radha desires to defer additional testing at this time.  Fetal echo is scheduled. We will add her to our Fetal Care list. We will plan on Radha returning for a growth ultrasound at 28 weeks gestation.  Ultrasound 7/9/25 at 28w2d: Normal EFW 17%. AV canal defect again seen, ventricles upper limit of normal. Mild polyhydramnios (CRYSTAL 27). Normal fetal bowel. BPP 8/8. Follow up 1 week to recheck fluid.  [x]  1 hour Glucose (25-28 wks) 6-17-25 55 - PASSED  [x]  CBC (25-28 wks) 6-17-25 12.1/38.7      3rd Trimester  []  GBS (36-37 wks)  []  L&D consent  []  TOLAC consent (if needed)  []  Medicaid salpingectomy consent    Vaccines  []  COVID  []  Flu (September to May)  []  Tdap (27-36 wks)  []  RSV (32-36 wks Sept-Jan)             Relevant Orders    POCT UA (nonautomated) manually resulted (Completed)           Doing well today.  Has ultrasound this afternoon to recheck fluid around the baby.  Precautions reviewed. Advised her to call for any new problems.    RTC in 2 weeks      Esther Garcia MD

## 2025-07-23 ENCOUNTER — TELEPHONE (OUTPATIENT)
Dept: OBSTETRICS AND GYNECOLOGY | Facility: HOSPITAL | Age: 35
End: 2025-07-23
Payer: COMMERCIAL

## 2025-07-28 NOTE — PROGRESS NOTES
"Ob Visit  25     SUBJECTIVE      HPI: Radha Hernández \"Sierra\" is a 35 y.o.  at 31w1d here for RPNV.    She has no contractions, no bleeding, or no LOF.   Reports normal fetal movement.   Patient's nausea is started to come back. Feels like eating normally. Pulling cramping.    Her pregnancy is complicated by:  [place] [facility]  Increased risk T21  AV canal defect, ASD, VSD, AV valve regurg.  Anxiety  Nausea/vomiting in pregnancy  Seasonal allergies  L5/S1 bulging disc       OBJECTIVE  Objective   Physical Exam  Weight: 81.6 kg (180 lb)  BP: 110/70  Fetal Heart Rate: 130 Fundal Height (cm): 30 cm    Lab Results   Component Value Date    HGB 12.1 2025    HCT 38.7 2025         ASSESSMENT & PLAN    Radha Hernández \"Sierra\" is a 35 y.o.  at 31w1d here for the following concerns we addressed today:    Problem List Items Addressed This Visit       31 weeks gestation of pregnancy ([facility])    Overview   36 yo     Probable Trisomy 21.  Prequel testing: Elevated Trisomy 21 risk (91% residual risk) and abnormal findings at 19 week ultrasound    [place] [facility]    1st Trimester  [x]  OB profile/lab work 3-7-25 NL, BLOOD TYPE IS A+, RUBELLA IMMUNE  [x]  Pap -24 NEG HPV POS  [x]  GC DNA probe 3-7-25 NEG/NEG  [x]  Urine culture 3-7-25 NEG  [x]  Early A1c (BMI 30+) 3-7-25 5.4/108  [x]  Aneuploidy screening (Prequel 10+ wk/First Trimester Check 11-14 wk) PREQUEL: Elevated Trisomy 21 risk (91% residual risk); T13 and T18 negative  [x]  Carrier screening FORESIGHT - NEG x 3  [x]  First trimester anatomy US/NT  25 - 14w2d. Too far to measure nuchal translucency, appeared normal. Normal early anatomy findings. Anterior placenta. Follow up for 19-20 week anatomy ultrasound.    2nd Trimester  [x]  Anatomy US (19-21 wks). Ultrasound 25 at 19w2d: Biometry c/w GA. Anterior placenta. Decreased BPD/HL and BPD/FL ratios, mild ventriculomegaly and a suspected AV " canal defect in the setting of positive screening for Trisomy 21 is highly concerning for Trisomy 21. Discussed that diagnostic testing is still available, but suspect will return positive for Trisomy 21. Radha desires to defer additional testing at this time.  Fetal echo is scheduled. We will add her to our Fetal Care list. We will plan on Radha returning for a growth ultrasound at 28 weeks gestation.  Ultrasound 7/9/25 at 28w2d: Normal EFW 17%. AV canal defect again seen, ventricles upper limit of normal. Mild polyhydramnios (CRYSTAL 27). Normal fetal bowel. BPP 8/8. Follow up 1 week to recheck fluid.  [x]  1 hour Glucose (25-28 wks) 6-17-25 55 - PASSED  [x]  CBC (25-28 wks) 6-17-25 12.1/38.7      3rd Trimester  []  GBS (36-37 wks)  []  L&D consent  []  TOLAC consent (if needed)  []  Medicaid salpingectomy consent    Vaccines  []  COVID first 2 rounds  []  Flu (September to May)  []  Tdap (27-36 wks) discussed 7/29/25  []  RSV (32-36 wks Sept-Jan)             Relevant Medications    pyridoxine (Vitamin B-6) 25 mg tablet    Other Relevant Orders    POCT UA Automated manually resulted     Other Visit Diagnoses         Supervision of normal first pregnancy, antepartum ([facility])        Relevant Medications    pyridoxine (Vitamin B-6) 25 mg tablet        TDAP handout give, would recommend doing it next visit the latest.   Refill B6.  RTC in 2 weeks  Growth scan tomorrow  ECHO Thursday.    Wilda Puga MD

## 2025-07-29 ENCOUNTER — APPOINTMENT (OUTPATIENT)
Dept: OBSTETRICS AND GYNECOLOGY | Facility: CLINIC | Age: 35
End: 2025-07-29
Payer: COMMERCIAL

## 2025-07-29 VITALS — WEIGHT: 180 LBS | DIASTOLIC BLOOD PRESSURE: 70 MMHG | BODY MASS INDEX: 26.15 KG/M2 | SYSTOLIC BLOOD PRESSURE: 110 MMHG

## 2025-07-29 DIAGNOSIS — Z34.00 SUPERVISION OF NORMAL FIRST PREGNANCY, ANTEPARTUM (HHS-HCC): ICD-10-CM

## 2025-07-29 DIAGNOSIS — Z3A.31 31 WEEKS GESTATION OF PREGNANCY (HHS-HCC): ICD-10-CM

## 2025-07-29 LAB
POC BLOOD, URINE: NEGATIVE
POC GLUCOSE, URINE: NEGATIVE MG/DL
POC KETONES, URINE: NEGATIVE MG/DL
POC LEUKOCYTES, URINE: NEGATIVE
POC NITRITE,URINE: NEGATIVE
POC PROTEIN, URINE: ABNORMAL MG/DL

## 2025-07-29 PROCEDURE — 0501F PRENATAL FLOW SHEET: CPT | Performed by: OBSTETRICS & GYNECOLOGY

## 2025-07-29 RX ORDER — PYRIDOXINE HCL (VITAMIN B6) 25 MG
25 TABLET ORAL EVERY 8 HOURS PRN
Qty: 30 TABLET | Refills: 1 | Status: SHIPPED | OUTPATIENT
Start: 2025-07-29

## 2025-07-30 ENCOUNTER — TELEPHONE (OUTPATIENT)
Dept: OBSTETRICS AND GYNECOLOGY | Facility: HOSPITAL | Age: 35
End: 2025-07-30

## 2025-07-30 ENCOUNTER — APPOINTMENT (OUTPATIENT)
Dept: PEDIATRIC CARDIOLOGY | Facility: CLINIC | Age: 35
End: 2025-07-30
Payer: COMMERCIAL

## 2025-07-30 ENCOUNTER — ANCILLARY PROCEDURE (OUTPATIENT)
Dept: PEDIATRIC CARDIOLOGY | Facility: CLINIC | Age: 35
End: 2025-07-30
Payer: COMMERCIAL

## 2025-07-30 VITALS
TEMPERATURE: 98.6 F | DIASTOLIC BLOOD PRESSURE: 74 MMHG | SYSTOLIC BLOOD PRESSURE: 113 MMHG | BODY MASS INDEX: 26.78 KG/M2 | HEART RATE: 95 BPM | HEIGHT: 69 IN | OXYGEN SATURATION: 98 % | RESPIRATION RATE: 18 BRPM | WEIGHT: 180.78 LBS

## 2025-07-30 DIAGNOSIS — O35.8XX0 MATERNAL CARE FOR OTHER (SUSPECTED) FETAL ABNORMALITY AND DAMAGE, NOT APPLICABLE OR UNSPECIFIED (HHS-HCC): ICD-10-CM

## 2025-07-30 DIAGNOSIS — O35.BXX0 ABNORMAL FETAL ECHOCARDIOGRAPHY AFFECTING ANTEPARTUM CARE OF MOTHER, SINGLE OR UNSPECIFIED FETUS (HHS-HCC): Primary | ICD-10-CM

## 2025-07-30 DIAGNOSIS — O28.3 ABNORMAL FETAL ULTRASOUND: ICD-10-CM

## 2025-07-30 PROCEDURE — 93325 DOPPLER ECHO COLOR FLOW MAPG: CPT | Performed by: PEDIATRICS

## 2025-07-30 PROCEDURE — 3008F BODY MASS INDEX DOCD: CPT | Performed by: PEDIATRICS

## 2025-07-30 PROCEDURE — 76827 ECHO EXAM OF FETAL HEART: CPT | Performed by: PEDIATRICS

## 2025-07-30 PROCEDURE — 76825 ECHO EXAM OF FETAL HEART: CPT | Performed by: PEDIATRICS

## 2025-07-30 PROCEDURE — 99215 OFFICE O/P EST HI 40 MIN: CPT | Performed by: PEDIATRICS

## 2025-07-30 NOTE — PROGRESS NOTES
"Radha Zarate Aimedarshan was seen at the request of * No referring provider recorded for this case * for a chief complaint of increased risk of trisomy 21 on NIPT and possible AVC defect; a report with my findings is being sent via written or electronic means the referring physician with my recommendations for treatment.     I had the pleasure of seeing Radha Zarate \"Sierra\" Edgar in Pediatric Cardiology consultation at our [place] location as part of our prenatal heart program for follow up of a fetal echocardiogram on 25 that showed a complete AV canal. She was initially referred for increased risk of trisomy 21 on NIPT and possible AVC defect.  She is a 35 y.o. year-old  woman, currently 31w2d weeks gestation. Patient's last menstrual period was 2024. Estimated Date of Delivery: 25.  There have been no pregnancy complications.   She has not been hospitalized during this pregnancy.  She had a NIPT, which showed an increased risk of trisomy 21 (91% PPV).  She had a second trimester ultrasound which was concerning for a possible AVC canal.      Prior to the visit, I personally reviewed the cardiac portions of the obstetrical ultrasound performed on 25.  There is an AV canal defect. There is normal segmental anatomy with normal outflow tract and 3 vessel views.  There is no evidence of right or left ventricular outflow obstruction or significant valvular regurgitation.    Her previous obstetrical history is without complication.  Her past medical history is significant for anxiety.  She has no history of congenital heart disease, arrhythmia, cardiomyopathy, hypercholesterolemia, hypertension, diabetes, rheumatic heart disease, cancer, asthma, lupus, Sjogren syndrome, clotting disorder, depression, alcohol abuse, phenylketonuria, or DiGeorge.  She has had no surgeries.  She takes Reglan, vitamin B6, pepcid, Unisom, and TUMS.  She has no known allergies.  She is " "currently taking prenatal vitamins.      Her family history is negative for congenital heart disease, early atherosclerosis, sudden cardiac death, long QT syndrome, cardiomyopathy, aortic aneurysm, or genetic or metabolic disease.  She has a maternal uncle that passed away at age 40 from an MI and a maternal grandfather that had heart disease.    She currently lives with her spouse and is .  She works as a .  She does not smoke.  She denies illicit drug use or alcohol abuse.  She denies verbal, sexual, or physical abuse.     Delivery [facility]  Father of the baby's name: same    /74 (BP Location: Right arm, Patient Position: Sitting, BP Cuff Size: Adult)   Pulse 95   Temp 37 °C (98.6 °F)   Resp 18   Ht 1.755 m (5' 9.09\")   Wt 82 kg (180 lb 12.4 oz)   LMP 12/23/2024   SpO2 98%   BMI 26.62 kg/m²     She was resting comfortably in the examination room and alert, active and in no respiratory distress. Skin was without rash.  HEENT: moist mucous membranes, no JVD, goiter. Breathing is not labored.  She was acyanotic.  There was no peripheral edema.   The abdomen was gravid, soft, nontender with normal bowel sounds.  The liver was not palpable.  The spleen tip was not palpable.  She had a normal gait and normal strength in all extremities.  Cranial nerves II - XII are intact.  She had no clubbing, cyanosis, or edema.    Two-dimensional sector scan and Doppler fetal echocardiogram performed at 31w2d weeks gestation show segmental anatomy S,D,S with complete atrioventricular canal defect. There is no atrial dilation. The foramen ovale is patent. There is a large primum atrial septal defect. Two of four pulmonary veins (one right and one left) are seen returning normally to the left atrium. Normal superior vena cava and inferior vena cava. There is a common atrioventricular valve; with trivial atrioventricular valve regurgitation. In some views, the common valve appears " "to favor the left ventricle, however, there right ventricle is apex forming.  There is a a large inlet ventricular septal defect.  The left and right ventricular size and shortening are normal. No left ventricular outflow tract or right ventricular outflow tract obstruction. There is a left aortic arch. The aortic and ductal arches are patent. The fetal heart rate was within normal limits without ectopy or arrhythmia seen.  The spectral Doppler patterns were normal across all valves.  The spectral Doppler patterns of the ductal arch, aortic arch, aortic isthmus and umbilical artery were within normal limits.  No pericardial effusion.    In summary, Radha Zarate \"Sierra\" Edgar is a 35 y.o. year-old  woman, currently 31w2d weeks gestation, who had a fetal echocardiogram at today's visit that demonstrated a complete atrioventricular canal defect.  The defect appears balanced at the atrial and ventricular level. The diagnosis was discussed in detail with the parents including medical management in the post- period and surgical intervention, which generally occurs around 4-6 months of age.   In the meantime, we did not make any changes to her current delivery plan.  We did not prescribe any medications.  We did not recommend intervention.  As always, we recommend a heart healthy lifestyle. We recommend a cardiology consultation within 24 hours after birth.  Disposition will depend to either the NICU or  nursery will depend on this evaluation.  The findings and limitations of fetal echocardiography were explained.    LOC: 2  Today's fetal echocardiogram demonstrated a complete atrioventricular canal defect.  This is a cardiovascular anomaly or disease that is NOT expected to cause hemodynamic instability at birth, but has potential to cause hemodynamic instability.  Neonatology management in   Non emergent cardiology consultation (within 24hrs).  For fetal echocardiogram findings of an " unclear etiology, as long as the baby's clinical presentation allows, recommended a cardiology evaluation in conjunction with the neonatology and CTICU teams in [facility] to confirm fetal findings prior to ultimate disposition ([facility], CTICU or NICU*).       Scribe’s statement: I, Adela Amin CNP, am scribing for, and in the presence of, Dr. Chilel.    Thank you for allowing me to participate in Radha's care.  If you have any further questions, please do not hesitate to contact me.     Carlos Chilel M.D.  [facility], Director  Ambulatory Pediatric Cardiology   Division of Pediatric Cardiology  [facility]  [facility]   of Pediatrics, [employer]  [facility] -   [address]  [place] [address]  Office:  [phone]  Fax:       [phone]  e-mail:  [email or web address]

## 2025-07-30 NOTE — LETTER
"2025     Yasmani Briones MD  [address]  [facility] [address]    Patient: Sierra Hernández   YOB: 1990   Date of Visit: 2025       Dear Dr. Yasmani Briones MD:    Thank you for referring Sierra Hernández to me for evaluation. Below are my notes for this consultation.  If you have questions, please do not hesitate to call me. I look forward to following your patient along with you.       Sincerely,     Carlos Chilel MD      CC: Ina Cruz, DO Radha Isbell, APRN-CN, [facility]  Adela Amin, APRN-CNP  ______________________________________________________________________________________    Radha Hernández was seen at the request of * No referring provider recorded for this case * for a chief complaint of increased risk of trisomy 21 on NIPT and possible AVC defect; a report with my findings is being sent via written or electronic means the referring physician with my recommendations for treatment.     I had the pleasure of seeing Radha Zarate \"Sierra\" Edgar in Pediatric Cardiology consultation at our [place] location as part of our prenatal heart program for follow up of a fetal echocardiogram on 25 that showed a complete AV canal. She was initially referred for increased risk of trisomy 21 on NIPT and possible AVC defect.  She is a 35 y.o. year-old  woman, currently 31w2d weeks gestation. Patient's last menstrual period was 2024. Estimated Date of Delivery: 25.  There have been no pregnancy complications.   She has not been hospitalized during this pregnancy.  She had a NIPT, which showed an increased risk of trisomy 21 (91% PPV).  She had a second trimester ultrasound which was concerning for a possible AVC canal.      Prior to the visit, I personally reviewed the cardiac portions of the obstetrical ultrasound performed on 25.  There is an AV canal defect. There is normal segmental anatomy with normal " "outflow tract and 3 vessel views.  There is no evidence of right or left ventricular outflow obstruction or significant valvular regurgitation.    Her previous obstetrical history is without complication.  Her past medical history is significant for anxiety.  She has no history of congenital heart disease, arrhythmia, cardiomyopathy, hypercholesterolemia, hypertension, diabetes, rheumatic heart disease, cancer, asthma, lupus, Sjogren syndrome, clotting disorder, depression, alcohol abuse, phenylketonuria, or DiGeorge.  She has had no surgeries.  She takes Reglan, vitamin B6, pepcid, Unisom, and TUMS.  She has no known allergies.  She is currently taking prenatal vitamins.      Her family history is negative for congenital heart disease, early atherosclerosis, sudden cardiac death, long QT syndrome, cardiomyopathy, aortic aneurysm, or genetic or metabolic disease.  She has a maternal uncle that passed away at age 40 from an MI and a maternal grandfather that had heart disease.    She currently lives with her spouse and is .  She works as a .  She does not smoke.  She denies illicit drug use or alcohol abuse.  She denies verbal, sexual, or physical abuse.     [facility]ivery [facility]: [facility] or [facility]  Father of the baby's name: same    /74 (BP Location: Right arm, Patient Position: Sitting, BP Cuff Size: Adult)   Pulse 95   Temp 37 °C (98.6 °F)   Resp 18   Ht 1.755 m (5' 9.09\")   Wt 82 kg (180 lb 12.4 oz)   LMP 12/23/2024   SpO2 98%   BMI 26.62 kg/m²     She was resting comfortably in the examination room and alert, active and in no respiratory distress. Skin was without rash.  HEENT: moist mucous membranes, no JVD, goiter. Breathing is not labored.  She was acyanotic.  There was no peripheral edema.   The abdomen was gravid, soft, nontender with normal bowel sounds.  The liver was not palpable.  The spleen tip was not palpable.  She had a normal gait and normal strength in all " "extremities.  Cranial nerves II - XII are intact.  She had no clubbing, cyanosis, or edema.    Two-dimensional sector scan and Doppler fetal echocardiogram performed at 31w2d weeks gestation show segmental anatomy S,D,S with complete atrioventricular canal defect. There is no atrial dilation. The foramen ovale is patent. There is a large primum atrial septal defect. Two of four pulmonary veins (one right and one left) are seen returning normally to the left atrium. Normal superior vena cava and inferior vena cava. There is a common atrioventricular valve; with trivial atrioventricular valve regurgitation. In some views, the common valve appears to favor the left ventricle, however, there right ventricle is apex forming.  There is a a large inlet ventricular septal defect.  The left and right ventricular size and shortening are normal. No left ventricular outflow tract or right ventricular outflow tract obstruction. There is a left aortic arch. The aortic and ductal arches are patent. The fetal heart rate was within normal limits without ectopy or arrhythmia seen.  The spectral Doppler patterns were normal across all valves.  The spectral Doppler patterns of the ductal arch, aortic arch, aortic isthmus and umbilical artery were within normal limits.  No pericardial effusion.    In summary, Radha Na Zarate \"Sierra\" Edgar is a 35 y.o. year-old  woman, currently 31w2d weeks gestation, who had a fetal echocardiogram at today's visit that demonstrated a complete atrioventricular canal defect.  The defect appears balanced at the atrial and ventricular level. The diagnosis was discussed in detail with the parents including medical management in the post- period and surgical intervention, which generally occurs around 4-6 months of age.   In the meantime, we did not make any changes to her current delivery plan.  We did not prescribe any medications.  We did not recommend intervention.  As always, we " recommend a heart healthy lifestyle. We recommend a cardiology consultation within 24 hours after birth.  Disposition will depend to either the NICU or  nursery will depend on this evaluation.  The findings and limitations of fetal echocardiography were explained.    LOC: 2  Today's fetal echocardiogram demonstrated a complete atrioventricular canal defect.  This is a cardiovascular anomaly or disease that is NOT expected to cause hemodynamic instability at birth, but has potential to cause hemodynamic instability.  Neonatology management in DR.  Non emergent cardiology consultation (within 24hrs).  For fetal echocardiogram findings of an unclear etiology, as long as the baby's clinical presentation allows, recommended a cardiology evaluation in conjunction with the neonatology and CTICU teams in [facility] to confirm fetal findings prior to ultimate disposition ([facility], CTICU or NICU*).       Scribe’s statement: IAdela CNP, am scribing for, and in the presence of, Dr. Chilel.    Thank you for allowing me to participate in Radha's care.  If you have any further questions, please do not hesitate to contact me.     Carlos Chilel M.D.  [facility], Director  Ambulatory Pediatric Cardiology   Division of Pediatric Cardiology  [facility]  [facility]   of Pediatrics, [employer] [facility] [employer]  [facility] -   [address] [place] [address]  [place] [id number]  Office:  [phone]  Fax:       [phone]  e-mail:  [email or web address]

## 2025-07-31 ENCOUNTER — HOSPITAL ENCOUNTER (OUTPATIENT)
Dept: RADIOLOGY | Facility: CLINIC | Age: 35
Discharge: HOME | End: 2025-07-31
Payer: COMMERCIAL

## 2025-07-31 DIAGNOSIS — O28.5 ABNORMAL CHROMOSOMAL AND GENETIC FINDING ON ANTENATAL SCREENING MOTHER: ICD-10-CM

## 2025-07-31 PROCEDURE — 76819 FETAL BIOPHYS PROFIL W/O NST: CPT

## 2025-07-31 PROCEDURE — 76819 FETAL BIOPHYS PROFIL W/O NST: CPT | Performed by: STUDENT IN AN ORGANIZED HEALTH CARE EDUCATION/TRAINING PROGRAM

## 2025-07-31 PROCEDURE — 76816 OB US FOLLOW-UP PER FETUS: CPT | Performed by: STUDENT IN AN ORGANIZED HEALTH CARE EDUCATION/TRAINING PROGRAM

## 2025-07-31 PROCEDURE — 76816 OB US FOLLOW-UP PER FETUS: CPT

## 2025-08-04 ENCOUNTER — DOCUMENTATION (OUTPATIENT)
Dept: OBSTETRICS AND GYNECOLOGY | Facility: HOSPITAL | Age: 35
End: 2025-08-04
Payer: COMMERCIAL

## 2025-08-06 PROBLEM — Z3A.33 33 WEEKS GESTATION OF PREGNANCY (HHS-HCC): Status: ACTIVE | Noted: 2025-02-26

## 2025-08-07 ENCOUNTER — APPOINTMENT (OUTPATIENT)
Dept: SURGERY | Facility: HOSPITAL | Age: 35
End: 2025-08-07
Payer: COMMERCIAL

## 2025-08-07 DIAGNOSIS — O28.5 ABNORMAL CHROMOSOMAL AND GENETIC FINDING ON ANTENATAL SCREENING MOTHER: Primary | ICD-10-CM

## 2025-08-07 DIAGNOSIS — O35.BXX0 ABNORMAL FETAL ECHOCARDIOGRAPHY AFFECTING ANTEPARTUM CARE OF MOTHER, SINGLE OR UNSPECIFIED FETUS (HHS-HCC): ICD-10-CM

## 2025-08-07 PROCEDURE — 99204 OFFICE O/P NEW MOD 45 MIN: CPT

## 2025-08-12 ENCOUNTER — TELEMEDICINE (OUTPATIENT)
Dept: OTHER | Facility: HOSPITAL | Age: 35
End: 2025-08-12
Payer: COMMERCIAL

## 2025-08-12 ENCOUNTER — APPOINTMENT (OUTPATIENT)
Dept: OBSTETRICS AND GYNECOLOGY | Facility: CLINIC | Age: 35
End: 2025-08-12
Payer: COMMERCIAL

## 2025-08-12 VITALS — BODY MASS INDEX: 26.8 KG/M2 | DIASTOLIC BLOOD PRESSURE: 68 MMHG | WEIGHT: 182 LBS | SYSTOLIC BLOOD PRESSURE: 110 MMHG

## 2025-08-12 DIAGNOSIS — O35.BXX0 ANOMALY OF HEART OF FETUS AFFECTING PREGNANCY, ANTEPARTUM, SINGLE OR UNSPECIFIED FETUS: ICD-10-CM

## 2025-08-12 DIAGNOSIS — O28.5 ABNORMAL CHROMOSOMAL AND GENETIC FINDING ON ANTENATAL SCREENING MOTHER: ICD-10-CM

## 2025-08-12 DIAGNOSIS — O35.BXX0 ABNORMAL FETAL ECHOCARDIOGRAPHY AFFECTING ANTEPARTUM CARE OF MOTHER, SINGLE OR UNSPECIFIED FETUS (HHS-HCC): ICD-10-CM

## 2025-08-12 DIAGNOSIS — Z23 NEED FOR TDAP VACCINATION: ICD-10-CM

## 2025-08-12 DIAGNOSIS — Z3A.33 33 WEEKS GESTATION OF PREGNANCY (HHS-HCC): Primary | ICD-10-CM

## 2025-08-12 DIAGNOSIS — Z71.85 VACCINE COUNSELING: ICD-10-CM

## 2025-08-12 DIAGNOSIS — O35.DXX0: Primary | ICD-10-CM

## 2025-08-12 PROCEDURE — 99204 OFFICE O/P NEW MOD 45 MIN: CPT | Performed by: PEDIATRICS

## 2025-08-12 PROCEDURE — 1036F TOBACCO NON-USER: CPT | Performed by: PEDIATRICS

## 2025-08-12 PROCEDURE — 90715 TDAP VACCINE 7 YRS/> IM: CPT | Performed by: OBSTETRICS & GYNECOLOGY

## 2025-08-12 PROCEDURE — 0501F PRENATAL FLOW SHEET: CPT | Performed by: OBSTETRICS & GYNECOLOGY

## 2025-08-12 PROCEDURE — 90471 IMMUNIZATION ADMIN: CPT | Performed by: OBSTETRICS & GYNECOLOGY

## 2025-08-20 ENCOUNTER — HOSPITAL ENCOUNTER (OUTPATIENT)
Dept: RADIOLOGY | Facility: CLINIC | Age: 35
Discharge: HOME | End: 2025-08-20
Payer: COMMERCIAL

## 2025-08-20 DIAGNOSIS — O28.5 ABNORMAL CHROMOSOMAL AND GENETIC FINDING ON ANTENATAL SCREENING MOTHER: ICD-10-CM

## 2025-08-20 DIAGNOSIS — Z03.74 SUSPECTED PROBLEM WITH FETAL GROWTH NOT FOUND: ICD-10-CM

## 2025-08-20 PROCEDURE — 76816 OB US FOLLOW-UP PER FETUS: CPT

## 2025-08-20 PROCEDURE — 76819 FETAL BIOPHYS PROFIL W/O NST: CPT

## 2025-08-20 PROCEDURE — 76816 OB US FOLLOW-UP PER FETUS: CPT | Performed by: OBSTETRICS & GYNECOLOGY

## 2025-08-20 PROCEDURE — 76819 FETAL BIOPHYS PROFIL W/O NST: CPT | Performed by: OBSTETRICS & GYNECOLOGY

## 2025-08-21 ENCOUNTER — TELEPHONE (OUTPATIENT)
Dept: OBSTETRICS AND GYNECOLOGY | Facility: CLINIC | Age: 35
End: 2025-08-21
Payer: COMMERCIAL

## 2025-08-28 ENCOUNTER — APPOINTMENT (OUTPATIENT)
Dept: OBSTETRICS AND GYNECOLOGY | Facility: CLINIC | Age: 35
End: 2025-08-28
Payer: COMMERCIAL

## 2025-08-28 VITALS — BODY MASS INDEX: 27.39 KG/M2 | WEIGHT: 186 LBS | DIASTOLIC BLOOD PRESSURE: 76 MMHG | SYSTOLIC BLOOD PRESSURE: 112 MMHG

## 2025-08-28 DIAGNOSIS — O35.10X0: Primary | ICD-10-CM

## 2025-08-28 DIAGNOSIS — Z3A.35 35 WEEKS GESTATION OF PREGNANCY (HHS-HCC): ICD-10-CM

## 2025-08-28 PROBLEM — Z3A.36 36 WEEKS GESTATION OF PREGNANCY (HHS-HCC): Status: ACTIVE | Noted: 2025-02-26

## 2025-08-28 PROCEDURE — 59025 FETAL NON-STRESS TEST: CPT | Performed by: OBSTETRICS & GYNECOLOGY

## 2025-08-28 PROCEDURE — 81002 URINALYSIS NONAUTO W/O SCOPE: CPT | Performed by: OBSTETRICS & GYNECOLOGY

## 2025-08-28 PROCEDURE — 99213 OFFICE O/P EST LOW 20 MIN: CPT | Performed by: OBSTETRICS & GYNECOLOGY

## 2025-08-31 ENCOUNTER — ANESTHESIA EVENT (OUTPATIENT)
Dept: OBSTETRICS AND GYNECOLOGY | Facility: HOSPITAL | Age: 35
End: 2025-08-31
Payer: COMMERCIAL

## 2025-08-31 ENCOUNTER — ANESTHESIA (OUTPATIENT)
Dept: OBSTETRICS AND GYNECOLOGY | Facility: HOSPITAL | Age: 35
End: 2025-08-31
Payer: COMMERCIAL

## 2025-08-31 ENCOUNTER — HOSPITAL ENCOUNTER (INPATIENT)
Facility: HOSPITAL | Age: 35
End: 2025-08-31
Attending: OBSTETRICS & GYNECOLOGY | Admitting: OBSTETRICS & GYNECOLOGY
Payer: COMMERCIAL

## 2025-08-31 ENCOUNTER — CLINICAL DOCUMENTATION ONLY (OUTPATIENT)
Dept: OTHER | Facility: HOSPITAL | Age: 35
End: 2025-08-31

## 2025-08-31 PROBLEM — Z34.90 ENCOUNTER FOR INDUCTION OF LABOR: Status: ACTIVE | Noted: 2025-08-31

## 2025-08-31 PROBLEM — K21.9 GASTROESOPHAGEAL REFLUX DISEASE: Status: ACTIVE | Noted: 2025-08-31

## 2025-08-31 PROBLEM — G89.29 CHRONIC LOW BACK PAIN: Status: ACTIVE | Noted: 2025-08-31

## 2025-08-31 PROBLEM — M54.50 CHRONIC LOW BACK PAIN: Status: ACTIVE | Noted: 2025-08-31

## 2025-08-31 PROCEDURE — 2500000004 HC RX 250 GENERAL PHARMACY W/ HCPCS (ALT 636 FOR OP/ED)

## 2025-08-31 PROCEDURE — 3700000014 EPIDURAL BLOCK: Mod: GC

## 2025-08-31 PROCEDURE — 01967 NEURAXL LBR ANES VAG DLVR: CPT | Performed by: STUDENT IN AN ORGANIZED HEALTH CARE EDUCATION/TRAINING PROGRAM

## 2025-08-31 RX ORDER — LIDOCAINE HCL/EPINEPHRINE/PF 2%-1:200K
VIAL (ML) INJECTION AS NEEDED
Status: DISCONTINUED | OUTPATIENT
Start: 2025-08-31 | End: 2025-08-31

## 2025-08-31 RX ORDER — INDOMETHACIN 25 MG/1
CAPSULE ORAL AS NEEDED
Status: DISCONTINUED | OUTPATIENT
Start: 2025-08-31 | End: 2025-08-31

## 2025-08-31 RX ADMIN — Medication 4 ML: at 21:05

## 2025-08-31 RX ADMIN — Medication 5 ML: at 16:49

## 2025-08-31 RX ADMIN — Medication 3 ML: at 17:08

## 2025-08-31 RX ADMIN — LIDOCAINE HYDROCHLORIDE,EPINEPHRINE BITARTRATE 3 ML: 20; .005 INJECTION, SOLUTION EPIDURAL; INFILTRATION; INTRACAUDAL; PERINEURAL at 16:45

## 2025-08-31 RX ADMIN — Medication 10 ML/HR: at 16:50

## 2025-08-31 RX ADMIN — SODIUM BICARBONATE 1 MEQ: 84 INJECTION, SOLUTION INTRAVENOUS at 21:05

## 2025-08-31 SDOH — HEALTH STABILITY: MENTAL HEALTH: SUICIDAL BEHAVIOR (LIFETIME): NO

## 2025-08-31 SDOH — HEALTH STABILITY: MENTAL HEALTH: WISH TO BE DEAD (PAST 1 MONTH): NO

## 2025-08-31 SDOH — SOCIAL STABILITY: SOCIAL INSECURITY: ARE THERE ANY APPARENT SIGNS OF INJURIES/BEHAVIORS THAT COULD BE RELATED TO ABUSE/NEGLECT?: NO

## 2025-08-31 SDOH — HEALTH STABILITY: MENTAL HEALTH: HAVE YOU USED ANY SUBSTANCES (CANABIS, COCAINE, HEROIN, HALLUCINOGENS, INHALANTS, ETC.) IN THE PAST 12 MONTHS?: NO

## 2025-08-31 SDOH — SOCIAL STABILITY: SOCIAL INSECURITY: WITHIN THE LAST YEAR, HAVE YOU BEEN HUMILIATED OR EMOTIONALLY ABUSED IN OTHER WAYS BY YOUR PARTNER OR EX-PARTNER?: NO

## 2025-08-31 SDOH — SOCIAL STABILITY: SOCIAL INSECURITY
WITHIN THE LAST YEAR, HAVE YOU BEEN KICKED, HIT, SLAPPED, OR OTHERWISE PHYSICALLY HURT BY YOUR PARTNER OR EX-PARTNER?: NO

## 2025-08-31 SDOH — SOCIAL STABILITY: SOCIAL INSECURITY: HAS ANYONE EVER THREATENED TO HURT YOUR FAMILY OR YOUR PETS?: NO

## 2025-08-31 SDOH — ECONOMIC STABILITY: FOOD INSECURITY: WITHIN THE PAST 12 MONTHS, THE FOOD YOU BOUGHT JUST DIDN'T LAST AND YOU DIDN'T HAVE MONEY TO GET MORE.: NEVER TRUE

## 2025-08-31 SDOH — SOCIAL STABILITY: SOCIAL INSECURITY
WITHIN THE LAST YEAR, HAVE YOU BEEN RAPED OR FORCED TO HAVE ANY KIND OF SEXUAL ACTIVITY BY YOUR PARTNER OR EX-PARTNER?: NO

## 2025-08-31 SDOH — HEALTH STABILITY: MENTAL HEALTH: NON-SPECIFIC ACTIVE SUICIDAL THOUGHTS (PAST 1 MONTH): NO

## 2025-08-31 SDOH — ECONOMIC STABILITY: FOOD INSECURITY: WITHIN THE PAST 12 MONTHS, YOU WORRIED THAT YOUR FOOD WOULD RUN OUT BEFORE YOU GOT THE MONEY TO BUY MORE.: NEVER TRUE

## 2025-08-31 SDOH — HEALTH STABILITY: MENTAL HEALTH: WERE YOU ABLE TO COMPLETE ALL THE BEHAVIORAL HEALTH SCREENINGS?: YES

## 2025-08-31 SDOH — SOCIAL STABILITY: SOCIAL INSECURITY: WITHIN THE LAST YEAR, HAVE YOU BEEN AFRAID OF YOUR PARTNER OR EX-PARTNER?: NO

## 2025-08-31 SDOH — SOCIAL STABILITY: SOCIAL INSECURITY: PHYSICAL ABUSE: DENIES

## 2025-08-31 SDOH — SOCIAL STABILITY: SOCIAL INSECURITY: VERBAL ABUSE: DENIES

## 2025-08-31 SDOH — HEALTH STABILITY: MENTAL HEALTH: CURRENT SMOKER: 0

## 2025-08-31 SDOH — SOCIAL STABILITY: SOCIAL INSECURITY: HAVE YOU HAD ANY THOUGHTS OF HARMING ANYONE ELSE?: NO

## 2025-08-31 SDOH — SOCIAL STABILITY: SOCIAL INSECURITY: ARE YOU OR HAVE YOU BEEN THREATENED OR ABUSED PHYSICALLY, EMOTIONALLY, OR SEXUALLY BY ANYONE?: NO

## 2025-08-31 SDOH — SOCIAL STABILITY: SOCIAL INSECURITY: ABUSE SCREEN: ADULT

## 2025-08-31 SDOH — ECONOMIC STABILITY: HOUSING INSECURITY: DO YOU FEEL UNSAFE GOING BACK TO THE PLACE WHERE YOU ARE LIVING?: NO

## 2025-08-31 SDOH — SOCIAL STABILITY: SOCIAL INSECURITY: DO YOU FEEL ANYONE HAS EXPLOITED OR TAKEN ADVANTAGE OF YOU FINANCIALLY OR OF YOUR PERSONAL PROPERTY?: NO

## 2025-08-31 SDOH — SOCIAL STABILITY: SOCIAL INSECURITY: DOES ANYONE TRY TO KEEP YOU FROM HAVING/CONTACTING OTHER FRIENDS OR DOING THINGS OUTSIDE YOUR HOME?: NO

## 2025-08-31 SDOH — HEALTH STABILITY: MENTAL HEALTH: HAVE YOU USED ANY PRESCRIPTION DRUGS OTHER THAN PRESCRIBED IN THE PAST 12 MONTHS?: NO

## 2025-08-31 SDOH — SOCIAL STABILITY: SOCIAL INSECURITY: HAVE YOU HAD THOUGHTS OF HARMING ANYONE ELSE?: NO

## 2025-08-31 ASSESSMENT — LIFESTYLE VARIABLES
AUDIT-C TOTAL SCORE: 0
HOW MANY STANDARD DRINKS CONTAINING ALCOHOL DO YOU HAVE ON A TYPICAL DAY: PATIENT DOES NOT DRINK
HOW OFTEN DO YOU HAVE 6 OR MORE DRINKS ON ONE OCCASION: NEVER
HOW OFTEN DO YOU HAVE A DRINK CONTAINING ALCOHOL: NEVER
AUDIT-C TOTAL SCORE: 0
SKIP TO QUESTIONS 9-10: 1

## 2025-08-31 ASSESSMENT — PAIN SCALES - GENERAL
PAINLEVEL_OUTOF10: 7
PAINLEVEL_OUTOF10: 8
PAINLEVEL_OUTOF10: 0 - NO PAIN
PAINLEVEL_OUTOF10: 10 - WORST POSSIBLE PAIN
PAINLEVEL_OUTOF10: 0 - NO PAIN
PAINLEVEL_OUTOF10: 0 - NO PAIN
PAINLEVEL_OUTOF10: 10 - WORST POSSIBLE PAIN
PAINLEVEL_OUTOF10: 0 - NO PAIN
PAINLEVEL_OUTOF10: 0 - NO PAIN
PAINLEVEL_OUTOF10: 10 - WORST POSSIBLE PAIN
PAINLEVEL_OUTOF10: 10 - WORST POSSIBLE PAIN
PAINLEVEL_OUTOF10: 0 - NO PAIN
PAINLEVEL_OUTOF10: 10 - WORST POSSIBLE PAIN
PAINLEVEL_OUTOF10: 0 - NO PAIN
PAINLEVEL_OUTOF10: 8

## 2025-08-31 ASSESSMENT — PATIENT HEALTH QUESTIONNAIRE - PHQ9
SUM OF ALL RESPONSES TO PHQ9 QUESTIONS 1 & 2: 0
2. FEELING DOWN, DEPRESSED OR HOPELESS: NOT AT ALL
1. LITTLE INTEREST OR PLEASURE IN DOING THINGS: NOT AT ALL

## 2025-08-31 ASSESSMENT — ACTIVITIES OF DAILY LIVING (ADL): LACK_OF_TRANSPORTATION: NO

## 2025-09-01 ASSESSMENT — PAIN DESCRIPTION - DESCRIPTORS
DESCRIPTORS: ACHING
DESCRIPTORS: HEADACHE
DESCRIPTORS: CRAMPING
DESCRIPTORS: ACHING
DESCRIPTORS: HEADACHE

## 2025-09-01 ASSESSMENT — PAIN SCALES - GENERAL
PAINLEVEL_OUTOF10: 4
PAINLEVEL_OUTOF10: 4
PAINLEVEL_OUTOF10: 7
PAINLEVEL_OUTOF10: 9
PAINLEVEL_OUTOF10: 1

## 2025-09-02 ENCOUNTER — PHARMACY VISIT (OUTPATIENT)
Dept: PHARMACY | Facility: CLINIC | Age: 35
End: 2025-09-02
Payer: COMMERCIAL

## 2025-09-02 ENCOUNTER — APPOINTMENT (OUTPATIENT)
Dept: OBSTETRICS AND GYNECOLOGY | Facility: CLINIC | Age: 35
End: 2025-09-02
Payer: COMMERCIAL

## 2025-09-02 PROCEDURE — RXMED WILLOW AMBULATORY MEDICATION CHARGE

## 2025-09-02 ASSESSMENT — PAIN DESCRIPTION - DESCRIPTORS
DESCRIPTORS: CRAMPING
DESCRIPTORS: POUNDING;THROBBING
DESCRIPTORS: HEADACHE
DESCRIPTORS: SORE

## 2025-09-02 ASSESSMENT — PAIN SCALES - GENERAL
PAINLEVEL_OUTOF10: 1
PAINLEVEL_OUTOF10: 3
PAINLEVEL_OUTOF10: 0 - NO PAIN
PAINLEVEL_OUTOF10: 3
PAINLEVEL_OUTOF10: 5 - MODERATE PAIN

## 2025-09-03 ASSESSMENT — PAIN DESCRIPTION - DESCRIPTORS
DESCRIPTORS: ACHING
DESCRIPTORS: CRAMPING
DESCRIPTORS: ACHING

## 2025-09-03 ASSESSMENT — PAIN - FUNCTIONAL ASSESSMENT
PAIN_FUNCTIONAL_ASSESSMENT: 0-10
PAIN_FUNCTIONAL_ASSESSMENT: 0-10

## 2025-09-03 ASSESSMENT — PAIN SCALES - GENERAL
PAINLEVEL_OUTOF10: 4
PAINLEVEL_OUTOF10: 5 - MODERATE PAIN
PAINLEVEL_OUTOF10: 5 - MODERATE PAIN
PAINLEVEL_OUTOF10: 6
PAINLEVEL_OUTOF10: 2

## 2025-09-04 ENCOUNTER — PHARMACY VISIT (OUTPATIENT)
Dept: PHARMACY | Facility: CLINIC | Age: 35
End: 2025-09-04
Payer: COMMERCIAL

## 2025-09-04 PROCEDURE — RXMED WILLOW AMBULATORY MEDICATION CHARGE

## 2025-09-04 ASSESSMENT — PAIN DESCRIPTION - DESCRIPTORS
DESCRIPTORS: ACHING
DESCRIPTORS: ACHING

## 2025-09-04 ASSESSMENT — PAIN SCALES - GENERAL
PAINLEVEL_OUTOF10: 3
PAINLEVEL_OUTOF10: 2

## 2025-09-09 ENCOUNTER — APPOINTMENT (OUTPATIENT)
Dept: OBSTETRICS AND GYNECOLOGY | Facility: CLINIC | Age: 35
End: 2025-09-09
Payer: COMMERCIAL

## 2025-09-10 ENCOUNTER — APPOINTMENT (OUTPATIENT)
Dept: OBSTETRICS AND GYNECOLOGY | Facility: CLINIC | Age: 35
End: 2025-09-10
Payer: COMMERCIAL

## 2025-09-11 ENCOUNTER — APPOINTMENT (OUTPATIENT)
Dept: RADIOLOGY | Facility: CLINIC | Age: 35
End: 2025-09-11
Payer: COMMERCIAL

## 2025-09-15 ENCOUNTER — APPOINTMENT (OUTPATIENT)
Dept: OBSTETRICS AND GYNECOLOGY | Facility: CLINIC | Age: 35
End: 2025-09-15
Payer: COMMERCIAL

## 2025-09-17 ENCOUNTER — APPOINTMENT (OUTPATIENT)
Dept: OBSTETRICS AND GYNECOLOGY | Facility: CLINIC | Age: 35
End: 2025-09-17
Payer: COMMERCIAL

## 2025-09-26 ENCOUNTER — APPOINTMENT (OUTPATIENT)
Dept: OBSTETRICS AND GYNECOLOGY | Facility: CLINIC | Age: 35
End: 2025-09-26
Payer: COMMERCIAL